# Patient Record
Sex: FEMALE | Race: WHITE | NOT HISPANIC OR LATINO | Employment: OTHER | URBAN - METROPOLITAN AREA
[De-identification: names, ages, dates, MRNs, and addresses within clinical notes are randomized per-mention and may not be internally consistent; named-entity substitution may affect disease eponyms.]

---

## 2020-09-11 ENCOUNTER — TELEPHONE (OUTPATIENT)
Dept: FAMILY MEDICINE CLINIC | Facility: CLINIC | Age: 80
End: 2020-09-11

## 2020-09-11 ENCOUNTER — OFFICE VISIT (OUTPATIENT)
Dept: FAMILY MEDICINE CLINIC | Facility: CLINIC | Age: 80
End: 2020-09-11

## 2020-09-11 VITALS
DIASTOLIC BLOOD PRESSURE: 62 MMHG | BODY MASS INDEX: 56.74 KG/M2 | OXYGEN SATURATION: 99 % | HEIGHT: 60 IN | SYSTOLIC BLOOD PRESSURE: 138 MMHG | HEART RATE: 76 BPM | TEMPERATURE: 96.6 F | WEIGHT: 289 LBS

## 2020-09-11 DIAGNOSIS — E66.01 MORBIDLY OBESE (HCC): ICD-10-CM

## 2020-09-11 DIAGNOSIS — M79.7 FIBROMYALGIA: Primary | ICD-10-CM

## 2020-09-11 DIAGNOSIS — I10 ESSENTIAL HYPERTENSION: ICD-10-CM

## 2020-09-11 PROCEDURE — 99203 OFFICE O/P NEW LOW 30 MIN: CPT | Performed by: PHYSICIAN ASSISTANT

## 2020-09-11 RX ORDER — BENAZEPRIL HYDROCHLORIDE 20 MG/1
20 TABLET ORAL DAILY
Qty: 90 TABLET | Refills: 3 | Status: SHIPPED | OUTPATIENT
Start: 2020-09-11 | End: 2021-10-20

## 2020-09-11 RX ORDER — AMLODIPINE BESYLATE 5 MG/1
5 TABLET ORAL DAILY
Qty: 90 TABLET | Refills: 3 | Status: SHIPPED | OUTPATIENT
Start: 2020-09-11 | End: 2021-09-21

## 2020-09-11 RX ORDER — TIZANIDINE 4 MG/1
4 TABLET ORAL 2 TIMES DAILY
Qty: 180 TABLET | Refills: 3 | Status: SHIPPED | OUTPATIENT
Start: 2020-09-11 | End: 2021-12-20

## 2020-09-11 RX ORDER — DULOXETIN HYDROCHLORIDE 60 MG/1
CAPSULE, DELAYED RELEASE ORAL
COMMUNITY
End: 2020-09-11 | Stop reason: SDUPTHER

## 2020-09-11 RX ORDER — DULOXETIN HYDROCHLORIDE 60 MG/1
60 CAPSULE, DELAYED RELEASE ORAL 2 TIMES DAILY
Qty: 180 CAPSULE | Refills: 3 | Status: SHIPPED | OUTPATIENT
Start: 2020-09-11 | End: 2020-09-11 | Stop reason: SDUPTHER

## 2020-09-11 RX ORDER — TIZANIDINE 4 MG/1
TABLET ORAL
COMMUNITY
End: 2020-09-11 | Stop reason: SDUPTHER

## 2020-09-11 RX ORDER — BENAZEPRIL HYDROCHLORIDE 20 MG/1
TABLET ORAL
COMMUNITY
End: 2020-09-11 | Stop reason: SDUPTHER

## 2020-09-11 RX ORDER — DULOXETIN HYDROCHLORIDE 60 MG/1
CAPSULE, DELAYED RELEASE ORAL
Qty: 180 CAPSULE | Refills: 3 | Status: SHIPPED | OUTPATIENT
Start: 2020-09-11 | End: 2021-11-30

## 2020-09-11 RX ORDER — BETAMETHASONE DIPROPIONATE 0.5 MG/G
CREAM TOPICAL
COMMUNITY

## 2020-09-11 RX ORDER — AMLODIPINE BESYLATE 5 MG/1
TABLET ORAL
COMMUNITY
End: 2020-09-11 | Stop reason: SDUPTHER

## 2020-09-11 RX ORDER — PHENTERMINE HYDROCHLORIDE 37.5 MG/1
TABLET ORAL
COMMUNITY
End: 2021-06-24 | Stop reason: SDUPTHER

## 2020-09-11 NOTE — PROGRESS NOTES
Subjective   Desirae Menchaca is a 80 y.o. female  Establish Care (new patient est care, previous pcp shanti kelley ); Obesity (req refills on phentermine for wt loss ); and Hypertension (req refills on amlodipine for BP)      History of Present Illness  Patient is a pleasant 80-year-old white female who presents today as a new patient to our practice to establish care.  She is transferring her care from [facility].  Her daughter, Leesa Lew comes to our practice as a patient.  She is presenting today for follow-up on hypertension, fibromyalgia and morbid obesity.  Unfortunate her obesity is uncontrolled.  She has done well on phentermine in the past and is requesting refill this medication.  She is status post lap band surgery.  However she is also a survivor of gastric cancer in 2016.  She states that her fibromyalgia actually improved after going through chemo but her LAP-BAND had to be removed and her obesity is continued to struggle.  Blood pressures been stable on combination amlodipine benazepril, his fibromyalgia currently stable on Cymbalta and Zanaflex.  The following portions of the patient's history were reviewed and updated as appropriate: allergies, current medications, past social history and problem list    Review of Systems   Constitutional: Positive for appetite change and unexpected weight change. Negative for activity change and fatigue.   Respiratory: Negative for cough, chest tightness and shortness of breath.    Cardiovascular: Negative for chest pain, palpitations and leg swelling.   Gastrointestinal: Negative for abdominal distention, abdominal pain, diarrhea and nausea.   Genitourinary: Negative.    Musculoskeletal: Positive for myalgias ( Fibromyalgia stable on medication). Negative for arthralgias, gait problem and joint swelling.   Skin: Negative.  Negative for color change and rash.   Neurological: Negative for dizziness, syncope, weakness, numbness and headaches.    Psychiatric/Behavioral: Negative for dysphoric mood. The patient is not nervous/anxious.        Objective     Vitals:    09/11/20 0938   BP: 138/62   Pulse: 76   Temp: 96.6 °F (35.9 °C)   SpO2: 99%   BMI 56.4    Physical Exam   Constitutional: She is oriented to person, place, and time. She appears well-developed and well-nourished. No distress.   Obesity noted     HENT:   Head: Normocephalic and atraumatic.   Neck: No thyromegaly present.   Cardiovascular: Normal rate and regular rhythm.   Pulmonary/Chest: Effort normal and breath sounds normal. No respiratory distress.   Abdominal: Soft. There is no tenderness.   Neurological: She is alert and oriented to person, place, and time. No cranial nerve deficit. Coordination normal.   Skin: Skin is warm and dry. No rash noted. She is not diaphoretic. No erythema. No pallor.   Psychiatric: She has a normal mood and affect. Her behavior is normal. Judgment and thought content normal.   Nursing note and vitals reviewed.      Assessment/Plan     Desirae was seen today for establish care, obesity and hypertension.    Diagnoses and all orders for this visit:    Fibromyalgia    Essential hypertension    Morbidly obese (CMS/HCC)    Other orders  -     amLODIPine (NORVASC) 5 MG tablet; Take 1 tablet by mouth Daily. amlodipine 5 mg tablet  -     benazepril (LOTENSIN) 20 MG tablet; Take 1 tablet by mouth Daily. benazepril 20 mg tablet  -     tiZANidine (ZANAFLEX) 4 MG tablet; Take 1 tablet by mouth 2 (two) times a day.  1 tab two times a day  -     Discontinue: DULoxetine (Cymbalta) 60 MG capsule; Take 1 capsule by mouth 2 (Two) Times a Day. Cymbalta 60 mg capsule,delayed release  Take 2 capsules every day by oral route.    Prescription given for phentermine 37.5 mg tablets 1 daily #30 with 5 refills. discussed abuse potential controlled substance as of this medication.  Courage patient follow low calorie low-carb high-protein diet with adequate water intake and exercise on a daily  basis.  Follow-up in 6 months for recheck.

## 2020-09-11 NOTE — TELEPHONE ENCOUNTER
Pharmacy is calling about DULoxetine (Cymbalta) 60 MG capsule.  Pharmacy states instructions that came over state 1 twice a day and 2 once a day,  Pharmacy just needs better clarification.  Please advise

## 2021-04-22 RX ORDER — PHENTERMINE HYDROCHLORIDE 37.5 MG/1
TABLET ORAL
Qty: 30 TABLET | Refills: 4 | OUTPATIENT
Start: 2021-04-22

## 2021-05-28 ENCOUNTER — TELEPHONE (OUTPATIENT)
Dept: FAMILY MEDICINE CLINIC | Facility: CLINIC | Age: 81
End: 2021-05-28

## 2021-05-28 NOTE — TELEPHONE ENCOUNTER
Caller: Desirae Menchaca    Relationship: Self    Best call back number: [phone]    What medication are you requesting: PHENTERMINE.  NEED NEW SCRIPT, REFILL    If a prescription is needed, what is your preferred pharmacy and phone number:    [facility] [address] [place] [address] [address] - [phone]  - [phone] FX

## 2021-05-28 NOTE — TELEPHONE ENCOUNTER
Notified the patient that with this being a controlled substance she would need to be seen in office to have this refilled. Scheduled her an appointment in [place]

## 2021-06-24 ENCOUNTER — OFFICE VISIT (OUTPATIENT)
Dept: FAMILY MEDICINE CLINIC | Facility: CLINIC | Age: 81
End: 2021-06-24

## 2021-06-24 VITALS
BODY MASS INDEX: 57.33 KG/M2 | DIASTOLIC BLOOD PRESSURE: 74 MMHG | HEART RATE: 88 BPM | OXYGEN SATURATION: 98 % | TEMPERATURE: 97.2 F | WEIGHT: 292 LBS | SYSTOLIC BLOOD PRESSURE: 118 MMHG | HEIGHT: 60 IN

## 2021-06-24 DIAGNOSIS — F51.04 CHRONIC INSOMNIA: ICD-10-CM

## 2021-06-24 DIAGNOSIS — E66.01 MORBIDLY OBESE (HCC): ICD-10-CM

## 2021-06-24 DIAGNOSIS — S40.811A ABRASION OF RIGHT UPPER EXTREMITY, INITIAL ENCOUNTER: ICD-10-CM

## 2021-06-24 DIAGNOSIS — F51.04 CHRONIC INSOMNIA: Primary | ICD-10-CM

## 2021-06-24 DIAGNOSIS — E66.01 MORBIDLY OBESE (HCC): Primary | ICD-10-CM

## 2021-06-24 DIAGNOSIS — I10 ESSENTIAL HYPERTENSION: ICD-10-CM

## 2021-06-24 PROCEDURE — 99214 OFFICE O/P EST MOD 30 MIN: CPT | Performed by: PHYSICIAN ASSISTANT

## 2021-06-24 RX ORDER — PHENTERMINE HYDROCHLORIDE 37.5 MG/1
37.5 TABLET ORAL
Qty: 30 TABLET | Refills: 5 | Status: SHIPPED | OUTPATIENT
Start: 2021-06-24 | End: 2022-08-09

## 2021-06-24 RX ORDER — ZOLPIDEM TARTRATE 10 MG/1
10 TABLET ORAL NIGHTLY PRN
Qty: 30 TABLET | Refills: 5 | Status: SHIPPED | OUTPATIENT
Start: 2021-06-24 | End: 2022-08-09

## 2021-06-24 RX ORDER — SULFAMETHOXAZOLE AND TRIMETHOPRIM 800; 160 MG/1; MG/1
1 TABLET ORAL 2 TIMES DAILY
Qty: 10 TABLET | Refills: 0 | Status: SHIPPED | OUTPATIENT
Start: 2021-06-24 | End: 2022-08-09

## 2021-06-24 RX ORDER — MAGNESIUM HYDROXIDE 1200 MG/15ML
1000 LIQUID ORAL 2 TIMES DAILY
Qty: 60 ML | Refills: 0 | Status: SHIPPED | OUTPATIENT
Start: 2021-06-24 | End: 2022-08-09

## 2021-06-24 NOTE — PROGRESS NOTES
Subjective   Desirae Menchaca is a 81 y.o. female  Weight Check (follow up on weight, req refills on phentermine ), Insomnia (increased insomnia x1 month, wants to discuss retarting ambien), and Wound Check (fell 2 weeks ago, wound on right forearm )      History of Present Illness  Patient is a pleasant 81-year-old white female who comes in today for evaluation of 4 separate uncontrolled medical problems.  She is follow-up on weight loss management association with morbid obesity she is been out of her phentermine for couple of months and she states that she is struggling with appetite suppression, BMI today is 57.03.  She feels significant improvement in her lipid control her appetite and eat a healthier lower calorie diet when on phentermine and denies any adverse effects when she is on it she states he feels better overall.  Second issue is chronic insomnia which is currently uncontrolled she has a mild Ambien in the past without this medication currently.  She is adding over-the-counter sleep aids without relief, has trouble falling asleep and staying asleep, has not had any adverse effects with Ambien in the past pacifically no nocturnal awakenings or sleepwalking.  She is able to fall asleep better and stay asleep better usually about 6 hours of solid sleep on Ambien.  Third issue is a wound on her right arm she states that she tripped over some shoes in her house and scraped her arm on the posterior chair a couple weeks ago scraping some of her skin off.  She has been applying over-the-counter antibiotic ointment and keeping clean and dry, no drainage but the wound has not healed.  Lastly patient is following up on hypertension which is currently stable on medication denies any adverse effect from medication.  The following portions of the patient's history were reviewed and updated as appropriate: allergies, current medications, past social history and problem list    Review of Systems   Constitutional:  Positive for activity change, appetite change and unexpected weight change. Negative for fatigue and fever.   Respiratory: Negative.    Cardiovascular: Negative.  Negative for chest pain.   Gastrointestinal: Negative for abdominal distention, abdominal pain, diarrhea and nausea.   Musculoskeletal: Negative for arthralgias.   Skin: Positive for color change and wound. Negative for pallor and rash.   Neurological: Negative.  Negative for tremors, weakness, light-headedness and headaches.   Psychiatric/Behavioral: Positive for sleep disturbance. Negative for agitation, behavioral problems, confusion, decreased concentration, dysphoric mood, hallucinations, self-injury and suicidal ideas. The patient is not nervous/anxious and is not hyperactive.      BMI 57.03  Objective     Vitals:    06/24/21 0902   BP: 118/74   Pulse: 88   Temp: 97.2 °F (36.2 °C)   SpO2: 98%       Physical Exam  Vitals and nursing note reviewed.   Constitutional:       General: She is not in acute distress.     Appearance: Normal appearance. She is well-developed. She is obese. She is not ill-appearing, toxic-appearing or diaphoretic.      Comments: Obesity noted     HENT:      Head: Normocephalic and atraumatic.   Eyes:      Conjunctiva/sclera: Conjunctivae normal.   Neck:      Thyroid: No thyromegaly.      Vascular: No JVD.   Cardiovascular:      Rate and Rhythm: Normal rate and regular rhythm.      Heart sounds: Normal heart sounds. No murmur heard.     Pulmonary:      Effort: Pulmonary effort is normal. No respiratory distress.      Breath sounds: Normal breath sounds.   Chest:      Chest wall: No tenderness.   Abdominal:      General: There is no distension.      Palpations: Abdomen is soft.      Tenderness: There is no abdominal tenderness.   Skin:     General: Skin is warm and dry.      Coloration: Skin is not pale.      Findings: Erythema present.      Comments: Examination of right forearm reveals an abrasion of the skin partially crusted  measuring 4 x 5 cm, no surrounding erythema, no drainage, nontender   Neurological:      Mental Status: She is alert and oriented to person, place, and time.      Coordination: Coordination normal.   Psychiatric:         Attention and Perception: She is attentive.         Mood and Affect: Mood normal.         Behavior: Behavior normal.         Thought Content: Thought content normal.         Judgment: Judgment normal.         Assessment/Plan     Diagnoses and all orders for this visit:    1. Morbidly obese (CMS/HCC) (Primary)    2. Chronic insomnia    3. Abrasion of right upper extremity, initial encounter    4. Essential hypertension    Other orders  -     sulfamethoxazole-trimethoprim (Bactrim DS) 800-160 MG per tablet; Take 1 tablet by mouth 2 (Two) Times a Day.  Dispense: 10 tablet; Refill: 0  -     mupirocin (BACTROBAN) 2 % ointment; Apply  topically to the appropriate area as directed 2 (Two) Times a Day.  Dispense: 15 g; Refill: 0  -     sodium chloride (NS) 0.9 % irrigation; Irrigate with 1,000 mL to the affected area as directed by provider 2 (two) times a day.  Dispense: 60 mL; Refill: 0    Prescriptions will be sent for phentermine 37.5 mg tablets 1 daily to assist with weight loss #30 with 5 refills and Ambien 10 mg 1 nightly for insomnia #30 with 5 refills.  Discussed abuse potential and controlled substance status of these medications, discussed need to follow a healthy low calorie high protein diet with adequate water intake.  Discussed need to make sure she is going to bed and stay in bed after taking Ambien at bedtime.  Follow-up in 6 months for recheck of insomnia, hypertension and weight loss management with obesity, follow-up in 2 weeks if wound on arm has not resolved or sooner if any worsening occurs.  Discussed routine wound care/bandaging for home as well

## 2021-09-21 RX ORDER — AMLODIPINE BESYLATE 5 MG/1
TABLET ORAL
Qty: 90 TABLET | Refills: 0 | Status: SHIPPED | OUTPATIENT
Start: 2021-09-21 | End: 2021-12-20

## 2021-10-20 RX ORDER — BENAZEPRIL HYDROCHLORIDE 20 MG/1
TABLET ORAL
Qty: 90 TABLET | Refills: 0 | Status: SHIPPED | OUTPATIENT
Start: 2021-10-20 | End: 2022-01-19 | Stop reason: SDUPTHER

## 2021-11-30 RX ORDER — DULOXETIN HYDROCHLORIDE 60 MG/1
CAPSULE, DELAYED RELEASE ORAL
Qty: 180 CAPSULE | Refills: 3 | Status: SHIPPED | OUTPATIENT
Start: 2021-11-30

## 2021-12-20 DIAGNOSIS — F51.04 CHRONIC INSOMNIA: ICD-10-CM

## 2021-12-20 RX ORDER — ZOLPIDEM TARTRATE 10 MG/1
TABLET ORAL
Qty: 30 TABLET | OUTPATIENT
Start: 2021-12-20

## 2021-12-20 RX ORDER — TIZANIDINE 4 MG/1
TABLET ORAL
Qty: 180 TABLET | Refills: 0 | Status: SHIPPED | OUTPATIENT
Start: 2021-12-20 | End: 2022-06-13

## 2021-12-20 RX ORDER — AMLODIPINE BESYLATE 5 MG/1
TABLET ORAL
Qty: 90 TABLET | Refills: 0 | Status: SHIPPED | OUTPATIENT
Start: 2021-12-20 | End: 2022-08-09

## 2022-01-19 RX ORDER — BENAZEPRIL HYDROCHLORIDE 20 MG/1
20 TABLET ORAL DAILY
Qty: 90 TABLET | Refills: 0 | Status: SHIPPED | OUTPATIENT
Start: 2022-01-19 | End: 2022-08-09

## 2022-02-24 ENCOUNTER — TELEPHONE (OUTPATIENT)
Dept: FAMILY MEDICINE CLINIC | Facility: CLINIC | Age: 82
End: 2022-02-24

## 2022-06-13 RX ORDER — TIZANIDINE 4 MG/1
TABLET ORAL
Qty: 180 TABLET | Refills: 3 | Status: SHIPPED | OUTPATIENT
Start: 2022-06-13

## 2022-08-05 ENCOUNTER — TELEPHONE (OUTPATIENT)
Dept: BARIATRICS/WEIGHT MGMT | Facility: CLINIC | Age: 82
End: 2022-08-05

## 2022-08-05 NOTE — TELEPHONE ENCOUNTER
[facility] called and stated that pt has inflammation around her lapband and needs appt with Dr pacheco BAEZ. Please advise. Thanks!     Narendra routed this back to me, and would like to know how to proceed.

## 2022-08-09 ENCOUNTER — OFFICE VISIT (OUTPATIENT)
Dept: BARIATRICS/WEIGHT MGMT | Facility: CLINIC | Age: 82
End: 2022-08-09

## 2022-08-09 VITALS
WEIGHT: 287 LBS | BODY MASS INDEX: 56.35 KG/M2 | DIASTOLIC BLOOD PRESSURE: 82 MMHG | OXYGEN SATURATION: 99 % | HEART RATE: 79 BPM | HEIGHT: 60 IN | TEMPERATURE: 97.1 F | SYSTOLIC BLOOD PRESSURE: 128 MMHG

## 2022-08-09 DIAGNOSIS — K95.09 COMPLICATION OF GASTRIC BANDING: Primary | ICD-10-CM

## 2022-08-09 PROCEDURE — 99214 OFFICE O/P EST MOD 30 MIN: CPT | Performed by: PHYSICIAN ASSISTANT

## 2022-08-09 RX ORDER — FUROSEMIDE 20 MG/1
20 TABLET ORAL DAILY PRN
COMMUNITY

## 2022-08-09 RX ORDER — BUSPIRONE HYDROCHLORIDE 10 MG/1
TABLET ORAL EVERY 12 HOURS SCHEDULED
COMMUNITY
End: 2022-10-18

## 2022-08-09 RX ORDER — CLOBETASOL PROPIONATE 0.46 MG/ML
SOLUTION TOPICAL
COMMUNITY

## 2022-08-09 RX ORDER — TRIAMTERENE AND HYDROCHLOROTHIAZIDE 37.5; 25 MG/1; MG/1
TABLET ORAL DAILY
COMMUNITY

## 2022-08-09 RX ORDER — BENAZEPRIL HYDROCHLORIDE 40 MG/1
TABLET, FILM COATED ORAL
COMMUNITY

## 2022-08-09 RX ORDER — ZOLPIDEM TARTRATE 10 MG/1
10 TABLET ORAL NIGHTLY PRN
COMMUNITY

## 2022-08-09 RX ORDER — OMEPRAZOLE 40 MG/1
CAPSULE, DELAYED RELEASE ORAL
COMMUNITY
End: 2022-10-18

## 2022-08-30 ENCOUNTER — TELEPHONE (OUTPATIENT)
Dept: BARIATRICS/WEIGHT MGMT | Facility: CLINIC | Age: 82
End: 2022-08-30

## 2022-08-30 NOTE — TELEPHONE ENCOUNTER
Called pt and told her we are still waiting on CD images from , I told pt I would request them again today, but she might want to call and get them as well to possibly get them sooner. Pt stated that she would call today and get them.

## 2022-09-27 PROBLEM — C54.1 ENDOMETRIAL CANCER (HCC): Status: ACTIVE | Noted: 2022-09-27

## 2022-09-27 PROBLEM — I10 HYPERTENSION: Status: ACTIVE | Noted: 2022-09-27

## 2022-09-27 PROBLEM — K21.9 GERD (GASTROESOPHAGEAL REFLUX DISEASE): Status: ACTIVE | Noted: 2022-09-27

## 2022-09-27 PROBLEM — Z74.09 IMPAIRED MOBILITY: Status: ACTIVE | Noted: 2022-09-27

## 2022-09-28 ENCOUNTER — TELEPHONE (OUTPATIENT)
Dept: BARIATRICS/WEIGHT MGMT | Facility: CLINIC | Age: 82
End: 2022-09-28

## 2022-09-28 PROBLEM — M79.7 FIBROMYALGIA: Status: ACTIVE | Noted: 2022-09-28

## 2022-09-28 PROBLEM — F41.9 ANXIETY: Status: ACTIVE | Noted: 2022-09-28

## 2022-09-28 PROBLEM — E04.1 THYROID NODULE: Status: ACTIVE | Noted: 2022-09-28

## 2022-09-28 NOTE — TELEPHONE ENCOUNTER
"Called pt. She stated that she has tried to get the CD images as well but they apparently keep sending her to some \"[facility] just like we were given. I have made multiple attempts to call [facility] with no success. Pt stated that she has an appt on campus on 10/03 and stated that she would stop by the medical records office and  disc in person. I gave pt location and directions of how to get to the medical records. Pt understood with no questions or concerns.   "

## 2022-10-06 ENCOUNTER — TELEPHONE (OUTPATIENT)
Dept: BARIATRICS/WEIGHT MGMT | Facility: CLINIC | Age: 82
End: 2022-10-06

## 2022-10-06 NOTE — TELEPHONE ENCOUNTER
Pt called, would like to know if/when Dr. Carlson is going to review her CT images and get his recommendations. Please advise, thanks!

## 2022-10-11 RX ORDER — SODIUM CHLORIDE 0.9 % (FLUSH) 0.9 %
3 SYRINGE (ML) INJECTION EVERY 12 HOURS SCHEDULED
Status: CANCELLED | OUTPATIENT
Start: 2022-10-11

## 2022-10-11 RX ORDER — SODIUM CHLORIDE 9 MG/ML
150 INJECTION, SOLUTION INTRAVENOUS CONTINUOUS
Status: CANCELLED | OUTPATIENT
Start: 2022-10-11

## 2022-10-11 RX ORDER — SODIUM CHLORIDE 0.9 % (FLUSH) 0.9 %
3-10 SYRINGE (ML) INJECTION AS NEEDED
Status: CANCELLED | OUTPATIENT
Start: 2022-10-11

## 2022-10-12 NOTE — TELEPHONE ENCOUNTER
Called pt, advised about Dr. Carlson' concerns, EGD was orders and that she will be called to schedule. Pt understood with no further questions or concerns.

## 2022-10-17 PROBLEM — K95.09 COMPLICATION OF GASTRIC BANDING: Status: ACTIVE | Noted: 2022-10-17

## 2022-10-18 ENCOUNTER — OFFICE VISIT (OUTPATIENT)
Dept: BARIATRICS/WEIGHT MGMT | Facility: CLINIC | Age: 82
End: 2022-10-18

## 2022-10-18 VITALS
OXYGEN SATURATION: 97 % | WEIGHT: 276.5 LBS | DIASTOLIC BLOOD PRESSURE: 56 MMHG | HEIGHT: 60 IN | RESPIRATION RATE: 14 BRPM | TEMPERATURE: 96.8 F | SYSTOLIC BLOOD PRESSURE: 154 MMHG | HEART RATE: 119 BPM | BODY MASS INDEX: 54.28 KG/M2

## 2022-10-18 DIAGNOSIS — K95.09 COMPLICATION OF GASTRIC BANDING: Primary | ICD-10-CM

## 2022-10-18 PROCEDURE — 99214 OFFICE O/P EST MOD 30 MIN: CPT | Performed by: PHYSICIAN ASSISTANT

## 2022-10-18 RX ORDER — OMEPRAZOLE 40 MG/1
1 CAPSULE, DELAYED RELEASE ORAL DAILY
COMMUNITY

## 2022-10-18 RX ORDER — BUSPIRONE HYDROCHLORIDE 10 MG/1
10 TABLET ORAL DAILY PRN
COMMUNITY

## 2022-10-18 RX ORDER — TRAZODONE HYDROCHLORIDE 50 MG/1
TABLET ORAL
COMMUNITY
Start: 2022-09-26

## 2022-10-18 NOTE — PROGRESS NOTES
"[facility]  [address]  [address]  [facility] [id number] [phone]        Patient Name: Desirae Menchaca  YOB: 1940      Date of Visit: 10/18/2022      CC:  KATY eval - requesting band removal      HPI:  82 y.o. female s/p LAGB REG 11/2006 [facility].      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  s/p LIZA BSO omentectomy and AGB port removal 11/10/16 @[facility] - no residual CA.    LOV w/ GYN/ONC 7/21/22 - \"Patient presents for f/u of gyn malignancy (see details below). Patient recently went to outside clinic for CT and was unable to have the scan due to inability to obtain IV access. She called me before today's visit to notify me and stated that she would decline the scan anyway if her Ca125 was normal. She also states that due to impaired mobility, she declines pelvic exams and is followed by CT surveillance yearly. From the attempted sticks at the CT facility, she is very bruised on her arms. Furthermore, she reports difficulty swallowing, stating that she previously had a lap band procedure and feels this may be related to her band. Patient states she is unable to tolerate more than small amounts of food and feels she has a blockage. Also having heart burn. No VB, new pelvic pain, or changes in bowel or bladder. \"    CT ab/pel 7/29/22 @[facility] - \"Gastric lap band in similar position to comparison. Development of ill-defined soft tissue density adjacent to the gastric band along the lesser curvature of the stomach measuring approximately 3.6 x 2.6 cm (series 3 image 66). Connection tubing terminates in the anterior abdomen without subcutaneous access port. Development of ill-defined low-attenuation along the lesser curvature of the stomach, likely related to complication of adjacent lap band and less likely metastatic disease. \"    CT images reviewed by Dr. Carlson - concerning for band erosion, also noted " gallstones.      Returns today for reevaluation.  In a wheelchair, accompanied by her .  Reports progressive/worsening dysphagia for several months, and now w/ vomiting/regurgitation on a daily basis.  Has burning throat pain despite taking daily PPI.  Denies abd.pain.  No fevers/chills.      Desires band removal.  No recent endoscopic evals.  No recent labs.     Noted tachycardia on triage, but says was nervous about our evaluation today, and also says had difficulty transferring from wheelchair to weigh on the scale.  HR improved to 85 on recheck, after evaluation completed.  Denies hx cardiac issue.        Past Medical History:   Diagnosis Date   • Anxiety    • Arthritis    • Asthma    • Cataract    • Endometrial cancer (HCC) 2016    s/p TAHBSO + chemo, follows @[facility]   • Fibromyalgia    • GERD (gastroesophageal reflux disease)     on PPI   • Hyperlipidemia    • Hypertension    • Impaired mobility    • Morbid obesity (HCC)    • Thyroid nodule     U/S, eval pending     Past Surgical History:   Procedure Laterality Date   • ANKLE SURGERY     • BREAST BIOPSY     • EYE SURGERY     • FRACTURE SURGERY     • LAPAROSCOPIC GASTRIC BANDING  2006    w/ Dr. Carlson   • TONSILLECTOMY     • TOTAL ABDOMINAL HYSTERECTOMY  2016         Current Outpatient Medications:   •  Ammonium Lactate 5 % lotion, Every 12 (Twelve) Hours. Every 12 (Twelve) Hours., Disp: , Rfl:   •  benazepril (LOTENSIN) 40 MG tablet, benazepril 40 mg tablet  TAKE ONE TABLET BY MOUTH DAILY, Disp: , Rfl:   •  betamethasone, augmented, (DIPROLENE) 0.05 % cream, betamethasone, augmented 0.05 % topical cream  Apply to the palms and fingers twce a day for 2 weeks, then 2 times a week, if needed., Disp: , Rfl:   •  busPIRone (BUSPAR) 10 MG tablet, Every 12 (Twelve) Hours., Disp: , Rfl:   •  clobetasol (TEMOVATE) 0.05 % external solution, clobetasol 0.05 % scalp solution  Apply a thin layer to the affected areas on the scalp, nightly 2- 3 times a week, if needed.,  "Disp: , Rfl:   •  Diclofenac Sodium (VOLTAREN) 1 % gel gel, Voltaren 1 % topical gel  0.5 gm 2x/day, Disp: , Rfl:   •  DULoxetine (CYMBALTA) 60 MG capsule, TAKE TWO CAPSULES BY MOUTH DAILY, Disp: 180 capsule, Rfl: 3  •  furosemide (LASIX) 20 MG tablet, Take 20 mg by mouth Daily As Needed., Disp: , Rfl:   •  mupirocin (BACTROBAN) 2 % ointment, Apply  topically to the appropriate area as directed 2 (Two) Times a Day., Disp: 15 g, Rfl: 0  •  omeprazole (priLOSEC) 40 MG capsule, 1 capsule Daily., Disp: , Rfl:   •  tiZANidine (ZANAFLEX) 4 MG tablet, TAKE ONE TABLET BY MOUTH TWICE A DAY, Disp: 180 tablet, Rfl: 3  •  traZODone (DESYREL) 50 MG tablet, , Disp: , Rfl:   •  triamterene-hydrochlorothiazide (MAXZIDE-25) 37.5-25 MG per tablet, Daily., Disp: , Rfl:   •  zolpidem (AMBIEN) 10 MG tablet, Take 10 mg by mouth At Night As Needed for Sleep., Disp: , Rfl:     Allergies   Allergen Reactions   • Dexamethasone Swelling   • Ciprofloxacin Unknown - Low Severity   • Risperidone Unknown - Low Severity     Social History     Tobacco Use   • Smoking status: Never   Substance Use Topics   • Alcohol use: Yes     Alcohol/week: 10.0 standard drinks     Types: 10 Glasses of wine per week   • Drug use: Never       Review of Systems  As above.      /56 (BP Location: Right arm, Patient Position: Sitting, Cuff Size: Adult)   Pulse 119   Temp 96.8 °F (36 °C) (Infrared)   Resp 14   Ht 152.4 cm (60\")   Wt 125 kg (276 lb 8 oz)   SpO2 97%   BMI 54.00 kg/m²     Physical Exam  Constitutional:       Appearance: She is well-developed.      Comments: wearing a mask   Eyes:      General: No scleral icterus.  Cardiovascular:      Rate and Rhythm: Normal rate.      Comments: HR recheck 85  Pulmonary:      Effort: Pulmonary effort is normal.   Abdominal:      Palpations: Abdomen is soft.      Tenderness: There is no abdominal tenderness.      Hernia: No hernia is present.   Musculoskeletal:      Comments: in a wheelchair   Skin:     " General: Skin is warm and dry.      Findings: No rash.   Neurological:      Mental Status: She is alert.   Psychiatric:         Behavior: Behavior is cooperative.         Judgment: Judgment normal.           Assessment:   82 y.o. female s/p LAGB REG 11/2006 [facility], s/p port removal 2016 w/ GYN surgery @, pursuing band removal - CT findings concerning for band erosion.        Plan:   Will schedule EGD @[facility] w/ Dr. Carlson.  Risks/benefits discussed.  Additional input to follow.          FISH Bermudez

## 2022-10-24 ENCOUNTER — PRE-ADMISSION TESTING (OUTPATIENT)
Dept: PREADMISSION TESTING | Facility: HOSPITAL | Age: 82
End: 2022-10-24

## 2022-10-24 LAB
DEPRECATED RDW RBC AUTO: 44 FL (ref 37–54)
ERYTHROCYTE [DISTWIDTH] IN BLOOD BY AUTOMATED COUNT: 13.5 % (ref 12.3–15.4)
HCT VFR BLD AUTO: 40.7 % (ref 34–46.6)
HGB BLD-MCNC: 13.2 G/DL (ref 12–15.9)
MCH RBC QN AUTO: 28.6 PG (ref 26.6–33)
MCHC RBC AUTO-ENTMCNC: 32.4 G/DL (ref 31.5–35.7)
MCV RBC AUTO: 88.3 FL (ref 79–97)
PLATELET # BLD AUTO: 323 10*3/MM3 (ref 140–450)
PMV BLD AUTO: 9.9 FL (ref 6–12)
POTASSIUM SERPL-SCNC: 4.6 MMOL/L (ref 3.5–5.2)
RBC # BLD AUTO: 4.61 10*6/MM3 (ref 3.77–5.28)
WBC NRBC COR # BLD: 9.41 10*3/MM3 (ref 3.4–10.8)

## 2022-10-24 PROCEDURE — 85027 COMPLETE CBC AUTOMATED: CPT

## 2022-10-24 PROCEDURE — 36415 COLL VENOUS BLD VENIPUNCTURE: CPT

## 2022-10-24 PROCEDURE — 84132 ASSAY OF SERUM POTASSIUM: CPT

## 2022-10-24 NOTE — PAT
Patient viewed general PAT education video as instructed in their preoperative information received from their surgeon.  Patient stated the general PAT education video was viewed in its entirety and survey completed.  Copies of [facility] general education handouts (Incentive Spirometry, Meds to Beds Program, Patient Belongings, Pre-op skin preparation instructions, Blood Glucose testing, Visitor policy, Surgery FAQ, Code H) distributed to patient if not printed. Education related to the PAT pass and skin preparation for surgery (if applicable) completed in PAT as a reinforcement to PAT education video. Patient instructed to return PAT pass provided today as well as completed skin preparation sheet (if applicable) on the day of procedure.     Additionally if patient had not viewed video yet but intended to view it at home or in our waiting area, then referred them to the handout with QR code/link provided during PAT visit.  Instructed patient to complete survey after viewing the video in its entirety.  Encouraged patient/family to read [facility] general education handouts thoroughly and notify PAT staff with any questions or concerns. Patient verbalized understanding of all information and priority content.    Pt is seeing her cardiologist tomorrow for ekg and clearance (already set up for upcoming bariatric surgery). Pat to follow up tomorrow to make sure get copy of ekg and clearance on chart.

## 2022-10-25 NOTE — PAT
Called Amena's office requesting the EKG and cardiac clearance patient obtained today. The  was able to verbally state that the patient had an Echo and cardiac clearance but an EKG was not done.  sent a message to Dr. Beckwith to fax those records. At end of business day, no documents received by PAT for procedure tomorrow.    Please do an EKG on DOS. Chart logged to endoscopy.

## 2022-10-26 ENCOUNTER — ANESTHESIA (OUTPATIENT)
Dept: GASTROENTEROLOGY | Facility: HOSPITAL | Age: 82
End: 2022-10-26

## 2022-10-26 ENCOUNTER — HOSPITAL ENCOUNTER (OUTPATIENT)
Facility: HOSPITAL | Age: 82
Setting detail: HOSPITAL OUTPATIENT SURGERY
Discharge: HOME | End: 2022-10-26
Attending: SURGERY | Admitting: SURGERY

## 2022-10-26 ENCOUNTER — ANESTHESIA EVENT (OUTPATIENT)
Dept: GASTROENTEROLOGY | Facility: HOSPITAL | Age: 82
End: 2022-10-26

## 2022-10-26 VITALS
SYSTOLIC BLOOD PRESSURE: 157 MMHG | DIASTOLIC BLOOD PRESSURE: 76 MMHG | RESPIRATION RATE: 16 BRPM | OXYGEN SATURATION: 93 % | TEMPERATURE: 98.3 F | HEART RATE: 89 BPM

## 2022-10-26 DIAGNOSIS — K95.09 COMPLICATION OF GASTRIC BANDING: ICD-10-CM

## 2022-10-26 DIAGNOSIS — K80.20 CALCULUS OF GALLBLADDER WITHOUT CHOLECYSTITIS WITHOUT OBSTRUCTION: Primary | ICD-10-CM

## 2022-10-26 LAB
QT INTERVAL: 418 MS
QTC INTERVAL: 479 MS

## 2022-10-26 PROCEDURE — 88305 TISSUE EXAM BY PATHOLOGIST: CPT | Performed by: SURGERY

## 2022-10-26 PROCEDURE — 93005 ELECTROCARDIOGRAM TRACING: CPT | Performed by: ANESTHESIOLOGY

## 2022-10-26 PROCEDURE — 25010000002 PROPOFOL 10 MG/ML EMULSION

## 2022-10-26 PROCEDURE — 43239 EGD BIOPSY SINGLE/MULTIPLE: CPT | Performed by: SURGERY

## 2022-10-26 PROCEDURE — 93010 ELECTROCARDIOGRAM REPORT: CPT | Performed by: INTERNAL MEDICINE

## 2022-10-26 RX ORDER — PROPOFOL 10 MG/ML
VIAL (ML) INTRAVENOUS AS NEEDED
Status: DISCONTINUED | OUTPATIENT
Start: 2022-10-26 | End: 2022-10-26 | Stop reason: SURG

## 2022-10-26 RX ORDER — ESMOLOL HYDROCHLORIDE 10 MG/ML
INJECTION INTRAVENOUS AS NEEDED
Status: DISCONTINUED | OUTPATIENT
Start: 2022-10-26 | End: 2022-10-26 | Stop reason: SURG

## 2022-10-26 RX ORDER — SODIUM CHLORIDE 0.9 % (FLUSH) 0.9 %
3-10 SYRINGE (ML) INJECTION AS NEEDED
Status: DISCONTINUED | OUTPATIENT
Start: 2022-10-26 | End: 2022-10-26 | Stop reason: HOSPADM

## 2022-10-26 RX ORDER — SODIUM CHLORIDE 0.9 % (FLUSH) 0.9 %
3 SYRINGE (ML) INJECTION EVERY 12 HOURS SCHEDULED
Status: DISCONTINUED | OUTPATIENT
Start: 2022-10-26 | End: 2022-10-26

## 2022-10-26 RX ORDER — IPRATROPIUM BROMIDE AND ALBUTEROL SULFATE 2.5; .5 MG/3ML; MG/3ML
3 SOLUTION RESPIRATORY (INHALATION) ONCE AS NEEDED
Status: DISCONTINUED | OUTPATIENT
Start: 2022-10-26 | End: 2022-10-26 | Stop reason: HOSPADM

## 2022-10-26 RX ORDER — SODIUM CHLORIDE, SODIUM LACTATE, POTASSIUM CHLORIDE, CALCIUM CHLORIDE 600; 310; 30; 20 MG/100ML; MG/100ML; MG/100ML; MG/100ML
9 INJECTION, SOLUTION INTRAVENOUS CONTINUOUS
Status: DISCONTINUED | OUTPATIENT
Start: 2022-10-26 | End: 2022-10-26 | Stop reason: HOSPADM

## 2022-10-26 RX ORDER — SODIUM CHLORIDE 0.9 % (FLUSH) 0.9 %
10 SYRINGE (ML) INJECTION EVERY 12 HOURS SCHEDULED
Status: DISCONTINUED | OUTPATIENT
Start: 2022-10-26 | End: 2022-10-26 | Stop reason: HOSPADM

## 2022-10-26 RX ORDER — FAMOTIDINE 10 MG/ML
20 INJECTION, SOLUTION INTRAVENOUS ONCE
Status: DISCONTINUED | OUTPATIENT
Start: 2022-10-26 | End: 2022-10-26 | Stop reason: HOSPADM

## 2022-10-26 RX ORDER — MIDAZOLAM HYDROCHLORIDE 1 MG/ML
0.5 INJECTION INTRAMUSCULAR; INTRAVENOUS
Status: DISCONTINUED | OUTPATIENT
Start: 2022-10-26 | End: 2022-10-26

## 2022-10-26 RX ORDER — FAMOTIDINE 20 MG/1
20 TABLET, FILM COATED ORAL ONCE
Status: DISCONTINUED | OUTPATIENT
Start: 2022-10-26 | End: 2022-10-26

## 2022-10-26 RX ORDER — LIDOCAINE HYDROCHLORIDE 10 MG/ML
0.5 INJECTION, SOLUTION EPIDURAL; INFILTRATION; INTRACAUDAL; PERINEURAL ONCE AS NEEDED
Status: DISCONTINUED | OUTPATIENT
Start: 2022-10-26 | End: 2022-10-26

## 2022-10-26 RX ORDER — LIDOCAINE HYDROCHLORIDE 10 MG/ML
INJECTION, SOLUTION EPIDURAL; INFILTRATION; INTRACAUDAL; PERINEURAL AS NEEDED
Status: DISCONTINUED | OUTPATIENT
Start: 2022-10-26 | End: 2022-10-26 | Stop reason: SURG

## 2022-10-26 RX ORDER — SODIUM CHLORIDE 9 MG/ML
150 INJECTION, SOLUTION INTRAVENOUS CONTINUOUS
Status: DISCONTINUED | OUTPATIENT
Start: 2022-10-26 | End: 2022-10-26 | Stop reason: HOSPADM

## 2022-10-26 RX ORDER — ONDANSETRON 2 MG/ML
4 INJECTION INTRAMUSCULAR; INTRAVENOUS ONCE AS NEEDED
Status: DISCONTINUED | OUTPATIENT
Start: 2022-10-26 | End: 2022-10-26 | Stop reason: HOSPADM

## 2022-10-26 RX ORDER — SODIUM CHLORIDE 0.9 % (FLUSH) 0.9 %
10 SYRINGE (ML) INJECTION AS NEEDED
Status: DISCONTINUED | OUTPATIENT
Start: 2022-10-26 | End: 2022-10-26 | Stop reason: HOSPADM

## 2022-10-26 RX ADMIN — PROPOFOL 50 MCG/KG/MIN: 10 INJECTION, EMULSION INTRAVENOUS at 09:33

## 2022-10-26 RX ADMIN — PROPOFOL 100 MG: 10 INJECTION, EMULSION INTRAVENOUS at 09:33

## 2022-10-26 RX ADMIN — LIDOCAINE HYDROCHLORIDE 100 MG: 10 INJECTION, SOLUTION EPIDURAL; INFILTRATION; INTRACAUDAL; PERINEURAL at 09:33

## 2022-10-26 RX ADMIN — TOPICAL ANESTHETIC 2 SPRAY: 200 SPRAY DENTAL; PERIODONTAL at 09:20

## 2022-10-26 RX ADMIN — ESMOLOL HYDROCHLORIDE 10 MG: 10 INJECTION, SOLUTION INTRAVENOUS at 09:38

## 2022-10-26 RX ADMIN — Medication 10 ML: at 09:19

## 2022-10-26 RX ADMIN — SODIUM CHLORIDE, POTASSIUM CHLORIDE, SODIUM LACTATE AND CALCIUM CHLORIDE 9 ML/HR: 600; 310; 30; 20 INJECTION, SOLUTION INTRAVENOUS at 09:19

## 2022-10-26 NOTE — ANESTHESIA POSTPROCEDURE EVALUATION
Patient: Desirae Menchaca    Procedure Summary     Date: 10/26/22 Room / Location:  [facility] /  [facility]    Anesthesia Start: 0930 Anesthesia Stop: 0955    Procedure: ESOPHAGOGASTRODUODENOSCOPY WITH BIOPSY Diagnosis:       Complication of gastric banding      (Complication of gastric banding [K95.09])    Surgeons: Sulaiman Carlson MD Provider: Alvino Hamlin MD    Anesthesia Type: general, MAC ASA Status: 3          Anesthesia Type: general, MAC    Vitals  Vitals Value Taken Time   BP     Temp     Pulse 88 10/26/22 0954   Resp     SpO2 100 % 10/26/22 0954   Vitals shown include unvalidated device data.        Post Anesthesia Care and Evaluation    Patient location during evaluation: PACU  Patient participation: complete - patient participated  Level of consciousness: awake and alert  Pain management: adequate    Airway patency: patent  Anesthetic complications: No anesthetic complications  PONV Status: none  Cardiovascular status: hemodynamically stable and acceptable  Respiratory status: nonlabored ventilation, acceptable and face mask  Hydration status: acceptable    Comments: Sats 100%  /80  HR 91

## 2022-10-26 NOTE — ANESTHESIA PREPROCEDURE EVALUATION
Anesthesia Evaluation     Patient summary reviewed and Nursing notes reviewed   NPO Solid Status: > 8 hours  NPO Liquid Status: > 2 hours           Airway   Mallampati: II  TM distance: >3 FB  Neck ROM: full  No difficulty expected  Dental      Pulmonary    (-) COPD, asthma (no MDI), shortness of breath, recent URI, sleep apnea, not a smoker, no home oxygen  Cardiovascular   Exercise tolerance: poor (<4 METS)    ECG reviewed    (+) hypertension, hyperlipidemia,   (-) valvular problems/murmurs, CAD, dysrhythmias, angina, cardiac stents    ROS comment: ECG SR 1 AVB RBBB LpostB bifascicular block  ECHO  Cleared as low risk [place]ro    Neuro/Psych  (+) headaches, psychiatric history,    (-) seizures, CVA  GI/Hepatic/Renal/Endo    (+) obesity, morbid obesity, GERD,  thyroid problem hypothyroidism  (-) no renal disease (creat nl), diabetes    ROS Comment: S/p G banding complications -vomiting     Musculoskeletal     (+) back pain, myalgias,   Abdominal    Substance History      OB/GYN          Other   arthritis (sdevere back and knee pain limits activity),    history of cancer (endometrial ca)                  Anesthesia Plan    ASA 3     general and MAC     (TOP POM PFL)  intravenous induction       Plan discussed with CRNA.        CODE STATUS:

## 2022-10-27 LAB
CYTO UR: NORMAL
LAB AP CASE REPORT: NORMAL
LAB AP CLINICAL INFORMATION: NORMAL
PATH REPORT.FINAL DX SPEC: NORMAL
PATH REPORT.GROSS SPEC: NORMAL

## 2022-10-31 ENCOUNTER — TELEPHONE (OUTPATIENT)
Dept: BARIATRICS/WEIGHT MGMT | Facility: CLINIC | Age: 82
End: 2022-10-31

## 2022-10-31 NOTE — TELEPHONE ENCOUNTER
Pt has EGD on 10/26/22, would like to know the results from this with pathology results. Please advise, thanks!

## 2022-11-01 ENCOUNTER — TELEPHONE (OUTPATIENT)
Dept: BARIATRICS/WEIGHT MGMT | Facility: CLINIC | Age: 82
End: 2022-11-01

## 2022-11-01 NOTE — TELEPHONE ENCOUNTER
Called scheduling to get GBUS scheduled. It is scheduled for 11/03/22 @ 9:30am.     I called and gave pt details and instructions. Pt understood with no further questions or concerns and was thankful for the call and quick response.

## 2022-11-01 NOTE — TELEPHONE ENCOUNTER
GBUS order placed 10/26.       ----- Message from Sulaiman Carlson MD sent at 10/26/2022  9:43 AM EDT -----  Fortunately no erosion seen.  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  Please order an ultrasound of her gallbladder, CT scan shows gallstones.  Once I have seen the ultrasound results please schedule her for Lap AGBR/chanda poss IOC in the main OR with overnight stay.  Thanks!

## 2022-11-03 ENCOUNTER — HOSPITAL ENCOUNTER (OUTPATIENT)
Dept: ULTRASOUND IMAGING | Facility: HOSPITAL | Age: 82
Discharge: HOME | End: 2022-11-03
Admitting: PHYSICIAN ASSISTANT

## 2022-11-03 ENCOUNTER — TELEPHONE (OUTPATIENT)
Dept: BARIATRICS/WEIGHT MGMT | Facility: CLINIC | Age: 82
End: 2022-11-03

## 2022-11-03 DIAGNOSIS — K95.09 GASTRIC BAND SLIPPAGE: Primary | ICD-10-CM

## 2022-11-03 DIAGNOSIS — K80.20 CALCULUS OF GALLBLADDER WITHOUT CHOLECYSTITIS WITHOUT OBSTRUCTION: ICD-10-CM

## 2022-11-03 PROCEDURE — 76705 ECHO EXAM OF ABDOMEN: CPT

## 2022-11-03 NOTE — TELEPHONE ENCOUNTER
----- Message from Sulaiman Carlson MD sent at 11/3/2022  3:55 PM EDT -----  Yes okay to schedule, thanks!  ----- Message -----  From: Dinora Yang PA  Sent: 11/3/2022   3:15 PM EDT  To: Sulaiman Carlson MD    Here are the ultrasound results. Ok to schedule as outlined below?     IMPRESSION:  Moderate diffuse hepatic steatosis.     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        ----- Message -----  From: Sulaiman Carlson MD  Sent: 10/26/2022   9:44 AM EDT  To: FISH Mckay    Fortunately no erosion seen.  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  Please order an ultrasound of her gallbladder, CT scan shows gallstones.  Once I have seen the ultrasound results please schedule her for Lap AGBR/chanda poss IOC in the main OR with overnight stay.  Thanks!

## 2022-11-22 ENCOUNTER — TELEPHONE (OUTPATIENT)
Dept: BARIATRICS/WEIGHT MGMT | Facility: CLINIC | Age: 82
End: 2022-11-22

## 2022-11-22 NOTE — TELEPHONE ENCOUNTER
Pt called, left VM. She is very frustrated and stated that it is cruel we make her wait this long to do something. Would like to know what her next steps are in regards to surgical planning. Pt is  Please advise, thanks!

## 2022-11-23 NOTE — TELEPHONE ENCOUNTER
No cardiac clearance is in her media or notes, checked messages at PAT, verbal clearance was given for EGD. I called pt, told her that we need written cardiac clearance from her cardiologist, gave pt fax # for them to fax this to. Pt understood with no further questions or concerns.

## 2022-12-01 ENCOUNTER — TELEPHONE (OUTPATIENT)
Dept: BARIATRICS/WEIGHT MGMT | Facility: CLINIC | Age: 82
End: 2022-12-01

## 2022-12-01 NOTE — TELEPHONE ENCOUNTER
Pt called, cardiac clearance has been scanned in and given to you to review. Pt is very anxious to get scheduled. Please advise, thanks!

## 2022-12-05 NOTE — TELEPHONE ENCOUNTER
Called pt, advised that this cardiac clearance only gives clearance for EGD not ABGR, will need to speak with PCP to get this changed ASAP. Pt stated that she would read the letter herself and if she thinks it doesn't read the way she thinks it should she will speak with her PCP.

## 2022-12-07 ENCOUNTER — OFFICE VISIT (OUTPATIENT)
Dept: BARIATRICS/WEIGHT MGMT | Facility: CLINIC | Age: 82
End: 2022-12-07

## 2022-12-07 VITALS
HEIGHT: 60 IN | OXYGEN SATURATION: 95 % | TEMPERATURE: 97.8 F | WEIGHT: 274 LBS | BODY MASS INDEX: 53.79 KG/M2 | DIASTOLIC BLOOD PRESSURE: 90 MMHG | HEART RATE: 100 BPM | SYSTOLIC BLOOD PRESSURE: 138 MMHG

## 2022-12-07 DIAGNOSIS — K80.20 CALCULUS OF GALLBLADDER WITHOUT CHOLECYSTITIS WITHOUT OBSTRUCTION: ICD-10-CM

## 2022-12-07 DIAGNOSIS — K95.09 GASTRIC BAND SLIPPAGE: Primary | ICD-10-CM

## 2022-12-07 PROCEDURE — 99214 OFFICE O/P EST MOD 30 MIN: CPT | Performed by: PHYSICIAN ASSISTANT

## 2022-12-07 NOTE — PROGRESS NOTES
"[facility] Bariatric Surgery  [address]  [address]  [facility] [address] [phone]        Patient Name: Desirae Menchaca  YOB: 1940      Date of Visit: 12/07/2022        CC:  AGB eval - pursing band removal      HPI:  82 y.o. female s/p LAGB REG 11/2006 [facility].      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  s/p LIZA BSO omentectomy and AGB port removal 11/10/16 @[facility] - no residual CA.    LOV w/ GYN/ONC 7/21/22 - \"Patient presents for f/u of gyn malignancy (see details below). Patient recently went to outside clinic for CT and was unable to have the scan due to inability to obtain IV access. She called me before today's visit to notify me and stated that she would decline the scan anyway if her Ca125 was normal. She also states that due to impaired mobility, she declines pelvic exams and is followed by CT surveillance yearly. From the attempted sticks at the CT facility, she is very bruised on her arms. Furthermore, she reports difficulty swallowing, stating that she previously had a lap band procedure and feels this may be related to her band. Patient states she is unable to tolerate more than small amounts of food and feels she has a blockage. Also having heart burn. No VB, new pelvic pain, or changes in bowel or bladder. \"    CT ab/pel 7/29/22 @[facility] - \"Gastric lap band in similar position to comparison. Development of ill-defined soft tissue density adjacent to the gastric band along the lesser curvature of the stomach measuring approximately 3.6 x 2.6 cm (series 3 image 66). Connection tubing terminates in the anterior abdomen without subcutaneous access port. Development of ill-defined low-attenuation along the lesser curvature of the stomach, likely related to complication of adjacent lap band and less likely metastatic disease. \"    CT images reviewed by Dr. Carlson - concerning for band erosion, also noted " gallstones.      EGD 10/26/22 @[facility] w/ Dr. Carlson - findings: Chronic partial slip of the band without erosion appreciated.  Retained food material and liquid throughout the esophagus with mild dilatation and tortuosity.    Final Diagnosis   1.  GASTRIC ANTRUM BIOPSY:  Minimal chronic gastritis without activity.  Negative for intestinal metaplasia and dysplasia.  No Helicobacter pylori-like organisms identified on routine stains.    2.  DISTAL ESOPHAGUS BIOPSY:  Squamous mucosa with minimal increase in intraepithelial leukocytes without eosinophils.  Gastric cardia type mucosa with no significant histopathologic change.  Negative for intestinal metaplasia and dysplasia.    3.  MIDESOPHAGUS BIOPSY:  Squamous mucosa with mild basal layer hyperplasia.  Negative for intestinal metaplasia, dysplasia, intraepithelial eosinophils and glandular mucosa.          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      ----  Reports progressive/worsening dysphagia for several months.  Also w/ vomiting/regurgitation on a daily basis.  Has burning throat pain despite taking daily PPI.  Denies abd.pain.  No fevers/chills.      Has Cardiac Clearance 12/5/22 - low risk.    Denies ASA/NSAIDS/steroids/tobacco use/exposure.         Past Medical History:   Diagnosis Date   • Anxiety    • Arthritis    • Asthma    • Back pain    • Constipation    • Dermatitis     sees derm   • Endometrial cancer (HCC) 2016    s/p TAHBSO + chemo, follows @[facility]- was in port of lapband per pt   • Fibromyalgia    • GERD (gastroesophageal reflux disease)     on PPI   • Headache    • History of rheumatic fever    • Hyperlipidemia    • Hypertension    • Impaired mobility    • Morbid obesity (HCC)    • Murmur     just while pregnant   • Thyroid nodule     U/S, eval pending   • Vomiting     with lap band blockage   • Wears glasses      Past Surgical History:   Procedure Laterality  Date   • ANKLE SURGERY Left 1999    plate and 5 screws   • BREAST BIOPSY Right    • CATARACT EXTRACTION, BILATERAL Bilateral    • COLONOSCOPY     • DILATATION AND CURETTAGE      x2   • ENDOSCOPY N/A 10/26/2022    Procedure: ESOPHAGOGASTRODUODENOSCOPY WITH BIOPSY;  Surgeon: Sulaiman Carlson MD;  Location: [facility] ENDOSCOPY;  Service: General;  Laterality: N/A;   • LAPAROSCOPIC GASTRIC BANDING  2006    w/ Dr. Carlson   • TONSILLECTOMY     • TOTAL ABDOMINAL HYSTERECTOMY  2016    w/ omentectomy + AGB port removal @ - probable endometrioid adenocarcinoma   • TOTAL SHOULDER ARTHROPLASTY Bilateral    • VENOUS ACCESS DEVICE (PORT) REMOVAL           Current Outpatient Medications:   •  Ammonium Lactate 5 % lotion, Every 12 (Twelve) Hours. Every 12 (Twelve) Hours., Disp: , Rfl:   •  benazepril (LOTENSIN) 40 MG tablet, benazepril 40 mg tablet  TAKE ONE TABLET BY MOUTH DAILY, Disp: , Rfl:   •  betamethasone, augmented, (DIPROLENE) 0.05 % cream, betamethasone, augmented 0.05 % topical cream  Apply to the palms and fingers twce a day for 2 weeks, then 2 times a week, if needed., Disp: , Rfl:   •  busPIRone (BUSPAR) 10 MG tablet, Every 12 (Twelve) Hours., Disp: , Rfl:   •  clobetasol (TEMOVATE) 0.05 % external solution, clobetasol 0.05 % scalp solution  Apply a thin layer to the affected areas on the scalp, nightly 2- 3 times a week, if needed., Disp: , Rfl:   •  Diclofenac Sodium (VOLTAREN) 1 % gel gel, Voltaren 1 % topical gel  0.5 gm 2x/day, Disp: , Rfl:   •  DULoxetine (CYMBALTA) 60 MG capsule, TAKE TWO CAPSULES BY MOUTH DAILY, Disp: 180 capsule, Rfl: 3  •  furosemide (LASIX) 20 MG tablet, Take 20 mg by mouth Daily As Needed., Disp: , Rfl:   •  mupirocin (BACTROBAN) 2 % ointment, Apply  topically to the appropriate area as directed 2 (Two) Times a Day., Disp: 15 g, Rfl: 0  •  omeprazole (priLOSEC) 40 MG capsule, 1 capsule Daily., Disp: , Rfl:   •  tiZANidine (ZANAFLEX) 4 MG tablet, TAKE ONE TABLET BY MOUTH TWICE A DAY,  "Disp: 180 tablet, Rfl: 3  •  traZODone (DESYREL) 50 MG tablet, , Disp: , Rfl:   •  triamterene-hydrochlorothiazide (MAXZIDE-25) 37.5-25 MG per tablet, Daily., Disp: , Rfl:   •  zolpidem (AMBIEN) 10 MG tablet, Take 10 mg by mouth At Night As Needed for Sleep., Disp: , Rfl:     Allergies   Allergen Reactions   • Dexamethasone Swelling   • Ciprofloxacin Unknown - Low Severity   • Risperidone Unknown - Low Severity     Social History     Tobacco Use   • Smoking status: Never   Vaping Use   • Vaping Use: Never used   Substance Use Topics   • Alcohol use: Yes     Alcohol/week: 10.0 standard drinks     Types: 10 Glasses of wine per week   • Drug use: Never       Social History     Social History Narrative    .  Lives in [place].           Review of Systems  As above.      /90 (BP Location: Left arm, Patient Position: Sitting, Cuff Size: Adult)   Pulse 100   Temp 97.8 °F (36.6 °C) (Infrared)   Ht 152.4 cm (60\")   Wt 124 kg (274 lb)   SpO2 95%   BMI 53.51 kg/m²     Physical Exam  Constitutional:       Appearance: She is well-developed.      Comments: wearing a mask   Eyes:      General: No scleral icterus.  Cardiovascular:      Rate and Rhythm: Tachycardia present.   Pulmonary:      Effort: Pulmonary effort is normal.   Abdominal:      Palpations: Abdomen is soft.      Tenderness: There is no abdominal tenderness.      Hernia: No hernia is present.   Musculoskeletal:      Comments: in a wheelchair   Skin:     General: Skin is warm and dry.      Findings: No rash.   Neurological:      Mental Status: She is alert.   Psychiatric:         Behavior: Behavior is cooperative.         Judgment: Judgment normal.           Assessment:   82 y.o. female s/p LAGB REG 11/2006 [facility], s/p port removal 2016 w/ GYN surgery @[facility]      ICD-10-CM ICD-9-CM   1. Gastric band slippage  K95.09 996.59   2. Calculus of gallbladder without cholecystitis without obstruction  K80.20 574.20         Plan:  Will schedule Laparoscopic " Lapband Removal w/ Laparoscopic Cholecystectomy, possible IOC @[facility] w/ Dr. Carlson - observation/overnight stay.  Risks/benefits discussed.  Avoid ASA/NSAIDS x 1 week prior.  Patient eager to proceed.  Call w/ issues/concerns.         FISH Bermudez

## 2022-12-07 NOTE — H&P (VIEW-ONLY)
"[facility] Bariatric Surgery  [address]  [address]  [facility] [address] [phone]        Patient Name: Desirae Menchaca  YOB: 1940      Date of Visit: 12/07/2022        CC:  AGB eval - pursing band removal      HPI:  82 y.o. female s/p LAGB REG 11/2006 [facility].      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  s/p LIZA BSO omentectomy and AGB port removal 11/10/16 @[facility] - no residual CA.    LOV w/ GYN/ONC 7/21/22 - \"Patient presents for f/u of gyn malignancy (see details below). Patient recently went to outside clinic for CT and was unable to have the scan due to inability to obtain IV access. She called me before today's visit to notify me and stated that she would decline the scan anyway if her Ca125 was normal. She also states that due to impaired mobility, she declines pelvic exams and is followed by CT surveillance yearly. From the attempted sticks at the CT facility, she is very bruised on her arms. Furthermore, she reports difficulty swallowing, stating that she previously had a lap band procedure and feels this may be related to her band. Patient states she is unable to tolerate more than small amounts of food and feels she has a blockage. Also having heart burn. No VB, new pelvic pain, or changes in bowel or bladder. \"    CT ab/pel 7/29/22 @[facility] - \"Gastric lap band in similar position to comparison. Development of ill-defined soft tissue density adjacent to the gastric band along the lesser curvature of the stomach measuring approximately 3.6 x 2.6 cm (series 3 image 66). Connection tubing terminates in the anterior abdomen without subcutaneous access port. Development of ill-defined low-attenuation along the lesser curvature of the stomach, likely related to complication of adjacent lap band and less likely metastatic disease. \"    CT images reviewed by Dr. Carlson - concerning for band erosion, also noted " gallstones.      EGD 10/26/22 @[facility] w/ Dr. Carlson - findings: Chronic partial slip of the band without erosion appreciated.  Retained food material and liquid throughout the esophagus with mild dilatation and tortuosity.    Final Diagnosis   1.  GASTRIC ANTRUM BIOPSY:  Minimal chronic gastritis without activity.  Negative for intestinal metaplasia and dysplasia.  No Helicobacter pylori-like organisms identified on routine stains.    2.  DISTAL ESOPHAGUS BIOPSY:  Squamous mucosa with minimal increase in intraepithelial leukocytes without eosinophils.  Gastric cardia type mucosa with no significant histopathologic change.  Negative for intestinal metaplasia and dysplasia.    3.  MIDESOPHAGUS BIOPSY:  Squamous mucosa with mild basal layer hyperplasia.  Negative for intestinal metaplasia, dysplasia, intraepithelial eosinophils and glandular mucosa.          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      ----  Reports progressive/worsening dysphagia for several months.  Also w/ vomiting/regurgitation on a daily basis.  Has burning throat pain despite taking daily PPI.  Denies abd.pain.  No fevers/chills.      Has Cardiac Clearance 12/5/22 - low risk.    Denies ASA/NSAIDS/steroids/tobacco use/exposure.         Past Medical History:   Diagnosis Date   • Anxiety    • Arthritis    • Asthma    • Back pain    • Constipation    • Dermatitis     sees derm   • Endometrial cancer (HCC) 2016    s/p TAHBSO + chemo, follows @[facility]- was in port of lapband per pt   • Fibromyalgia    • GERD (gastroesophageal reflux disease)     on PPI   • Headache    • History of rheumatic fever    • Hyperlipidemia    • Hypertension    • Impaired mobility    • Morbid obesity (HCC)    • Murmur     just while pregnant   • Thyroid nodule     U/S, eval pending   • Vomiting     with lap band blockage   • Wears glasses      Past Surgical History:   Procedure Laterality  Date   • ANKLE SURGERY Left 1999    plate and 5 screws   • BREAST BIOPSY Right    • CATARACT EXTRACTION, BILATERAL Bilateral    • COLONOSCOPY     • DILATATION AND CURETTAGE      x2   • ENDOSCOPY N/A 10/26/2022    Procedure: ESOPHAGOGASTRODUODENOSCOPY WITH BIOPSY;  Surgeon: Sulaiman Carlson MD;  Location: [facility] ENDOSCOPY;  Service: General;  Laterality: N/A;   • LAPAROSCOPIC GASTRIC BANDING  2006    w/ Dr. Carlson   • TONSILLECTOMY     • TOTAL ABDOMINAL HYSTERECTOMY  2016    w/ omentectomy + AGB port removal @ - probable endometrioid adenocarcinoma   • TOTAL SHOULDER ARTHROPLASTY Bilateral    • VENOUS ACCESS DEVICE (PORT) REMOVAL           Current Outpatient Medications:   •  Ammonium Lactate 5 % lotion, Every 12 (Twelve) Hours. Every 12 (Twelve) Hours., Disp: , Rfl:   •  benazepril (LOTENSIN) 40 MG tablet, benazepril 40 mg tablet  TAKE ONE TABLET BY MOUTH DAILY, Disp: , Rfl:   •  betamethasone, augmented, (DIPROLENE) 0.05 % cream, betamethasone, augmented 0.05 % topical cream  Apply to the palms and fingers twce a day for 2 weeks, then 2 times a week, if needed., Disp: , Rfl:   •  busPIRone (BUSPAR) 10 MG tablet, Every 12 (Twelve) Hours., Disp: , Rfl:   •  clobetasol (TEMOVATE) 0.05 % external solution, clobetasol 0.05 % scalp solution  Apply a thin layer to the affected areas on the scalp, nightly 2- 3 times a week, if needed., Disp: , Rfl:   •  Diclofenac Sodium (VOLTAREN) 1 % gel gel, Voltaren 1 % topical gel  0.5 gm 2x/day, Disp: , Rfl:   •  DULoxetine (CYMBALTA) 60 MG capsule, TAKE TWO CAPSULES BY MOUTH DAILY, Disp: 180 capsule, Rfl: 3  •  furosemide (LASIX) 20 MG tablet, Take 20 mg by mouth Daily As Needed., Disp: , Rfl:   •  mupirocin (BACTROBAN) 2 % ointment, Apply  topically to the appropriate area as directed 2 (Two) Times a Day., Disp: 15 g, Rfl: 0  •  omeprazole (priLOSEC) 40 MG capsule, 1 capsule Daily., Disp: , Rfl:   •  tiZANidine (ZANAFLEX) 4 MG tablet, TAKE ONE TABLET BY MOUTH TWICE A DAY,  "Disp: 180 tablet, Rfl: 3  •  traZODone (DESYREL) 50 MG tablet, , Disp: , Rfl:   •  triamterene-hydrochlorothiazide (MAXZIDE-25) 37.5-25 MG per tablet, Daily., Disp: , Rfl:   •  zolpidem (AMBIEN) 10 MG tablet, Take 10 mg by mouth At Night As Needed for Sleep., Disp: , Rfl:     Allergies   Allergen Reactions   • Dexamethasone Swelling   • Ciprofloxacin Unknown - Low Severity   • Risperidone Unknown - Low Severity     Social History     Tobacco Use   • Smoking status: Never   Vaping Use   • Vaping Use: Never used   Substance Use Topics   • Alcohol use: Yes     Alcohol/week: 10.0 standard drinks     Types: 10 Glasses of wine per week   • Drug use: Never       Social History     Social History Narrative    .  Lives in [place].           Review of Systems  As above.      /90 (BP Location: Left arm, Patient Position: Sitting, Cuff Size: Adult)   Pulse 100   Temp 97.8 °F (36.6 °C) (Infrared)   Ht 152.4 cm (60\")   Wt 124 kg (274 lb)   SpO2 95%   BMI 53.51 kg/m²     Physical Exam  Constitutional:       Appearance: She is well-developed.      Comments: wearing a mask   Eyes:      General: No scleral icterus.  Cardiovascular:      Rate and Rhythm: Tachycardia present.   Pulmonary:      Effort: Pulmonary effort is normal.   Abdominal:      Palpations: Abdomen is soft.      Tenderness: There is no abdominal tenderness.      Hernia: No hernia is present.   Musculoskeletal:      Comments: in a wheelchair   Skin:     General: Skin is warm and dry.      Findings: No rash.   Neurological:      Mental Status: She is alert.   Psychiatric:         Behavior: Behavior is cooperative.         Judgment: Judgment normal.           Assessment:   82 y.o. female s/p LAGB REG 11/2006 [facility], s/p port removal 2016 w/ GYN surgery @[facility]      ICD-10-CM ICD-9-CM   1. Gastric band slippage  K95.09 996.59   2. Calculus of gallbladder without cholecystitis without obstruction  K80.20 574.20         Plan:  Will schedule Laparoscopic " Lapband Removal w/ Laparoscopic Cholecystectomy, possible IOC @[facility] w/ Dr. Carlson - observation/overnight stay.  Risks/benefits discussed.  Avoid ASA/NSAIDS x 1 week prior.  Patient eager to proceed.  Call w/ issues/concerns.         FISH Bermudez

## 2022-12-15 RX ORDER — ENOXAPARIN SODIUM 150 MG/ML
40 INJECTION SUBCUTANEOUS ONCE
Status: CANCELLED | OUTPATIENT
Start: 2022-12-15 | End: 2022-12-15

## 2022-12-15 RX ORDER — SODIUM CHLORIDE 9 MG/ML
150 INJECTION, SOLUTION INTRAVENOUS CONTINUOUS
Status: CANCELLED | OUTPATIENT
Start: 2022-12-15

## 2022-12-15 RX ORDER — SCOLOPAMINE TRANSDERMAL SYSTEM 1 MG/1
1 PATCH, EXTENDED RELEASE TRANSDERMAL ONCE
Status: CANCELLED | OUTPATIENT
Start: 2022-12-15 | End: 2022-12-15

## 2022-12-15 RX ORDER — SODIUM CHLORIDE 9 MG/ML
40 INJECTION, SOLUTION INTRAVENOUS AS NEEDED
Status: CANCELLED | OUTPATIENT
Start: 2022-12-15

## 2022-12-15 RX ORDER — SODIUM CHLORIDE 0.9 % (FLUSH) 0.9 %
3 SYRINGE (ML) INJECTION EVERY 12 HOURS SCHEDULED
Status: CANCELLED | OUTPATIENT
Start: 2022-12-15

## 2022-12-15 RX ORDER — SODIUM CHLORIDE 0.9 % (FLUSH) 0.9 %
3-10 SYRINGE (ML) INJECTION AS NEEDED
Status: CANCELLED | OUTPATIENT
Start: 2022-12-15

## 2022-12-16 ENCOUNTER — PRE-ADMISSION TESTING (OUTPATIENT)
Dept: PREADMISSION TESTING | Facility: HOSPITAL | Age: 82
End: 2022-12-16

## 2022-12-16 DIAGNOSIS — K80.20 CALCULUS OF GALLBLADDER WITHOUT CHOLECYSTITIS WITHOUT OBSTRUCTION: ICD-10-CM

## 2022-12-16 DIAGNOSIS — K95.09 GASTRIC BAND SLIPPAGE: ICD-10-CM

## 2022-12-16 LAB
DEPRECATED RDW RBC AUTO: 42.9 FL (ref 37–54)
ERYTHROCYTE [DISTWIDTH] IN BLOOD BY AUTOMATED COUNT: 13.2 % (ref 12.3–15.4)
HBA1C MFR BLD: 5.6 % (ref 4.8–5.6)
HCT VFR BLD AUTO: 41.7 % (ref 34–46.6)
HGB BLD-MCNC: 13.5 G/DL (ref 12–15.9)
MCH RBC QN AUTO: 29 PG (ref 26.6–33)
MCHC RBC AUTO-ENTMCNC: 32.4 G/DL (ref 31.5–35.7)
MCV RBC AUTO: 89.5 FL (ref 79–97)
PLATELET # BLD AUTO: 339 10*3/MM3 (ref 140–450)
PMV BLD AUTO: 10 FL (ref 6–12)
POTASSIUM SERPL-SCNC: 4.6 MMOL/L (ref 3.5–5.2)
RBC # BLD AUTO: 4.66 10*6/MM3 (ref 3.77–5.28)
WBC NRBC COR # BLD: 8.3 10*3/MM3 (ref 3.4–10.8)

## 2022-12-16 PROCEDURE — 87081 CULTURE SCREEN ONLY: CPT

## 2022-12-16 PROCEDURE — 85027 COMPLETE CBC AUTOMATED: CPT

## 2022-12-16 PROCEDURE — 36415 COLL VENOUS BLD VENIPUNCTURE: CPT

## 2022-12-16 PROCEDURE — 84132 ASSAY OF SERUM POTASSIUM: CPT

## 2022-12-16 PROCEDURE — 83036 HEMOGLOBIN GLYCOSYLATED A1C: CPT

## 2022-12-16 NOTE — PAT
ekg on chart from 10/26/22 with clearance on chart.     Patient viewed general PAT education video as instructed in their preoperative information received from their surgeon.  Patient stated the general PAT education video was viewed in its entirety and survey completed.  Copies of [facility] general education handouts (Incentive Spirometry, Meds to Beds Program, Patient Belongings, Pre-op skin preparation instructions, Blood Glucose testing, Visitor policy, Surgery FAQ, Code H) distributed to patient if not printed. Education related to the PAT pass and skin preparation for surgery (if applicable) completed in PAT as a reinforcement to PAT education video. Patient instructed to return PAT pass provided today as well as completed skin preparation sheet (if applicable) on the day of procedure.     Additionally if patient had not viewed video yet but intended to view it at home or in our waiting area, then referred them to the handout with QR code/link provided during PAT visit.  Instructed patient to complete survey after viewing the video in its entirety.  Encouraged patient/family to read PAT general education handouts thoroughly and notify PAT staff with any questions or concerns. Patient verbalized understanding of all information and priority content.    Patient instructed to drink 20 ounces of Gatorade and it needs to be completed 1 hour (for Main OR patients) or 2 hours (scheduled  section & [facility] patients) before given arrival time for procedure (NO RED Gatorade)    Patient verbalized understanding.    Per Anesthesia Request, patient instructed not to take their ACE/ARB medications on the AM of surgery.    Patient to apply Chlorhexadine wipes  to surgical area (as instructed) the night before procedure and the AM of procedure. Wipes provided.

## 2022-12-17 LAB — MRSA SPEC QL CULT: NORMAL

## 2022-12-21 ENCOUNTER — HOSPITAL ENCOUNTER (OUTPATIENT)
Facility: HOSPITAL | Age: 82
LOS: 1 days | Discharge: HOME | End: 2022-12-22
Attending: SURGERY | Admitting: SURGERY

## 2022-12-21 ENCOUNTER — ANESTHESIA EVENT (OUTPATIENT)
Dept: PERIOP | Facility: HOSPITAL | Age: 82
End: 2022-12-21

## 2022-12-21 ENCOUNTER — ANESTHESIA EVENT CONVERTED (OUTPATIENT)
Dept: ANESTHESIOLOGY | Facility: HOSPITAL | Age: 82
End: 2022-12-21

## 2022-12-21 ENCOUNTER — ANESTHESIA (OUTPATIENT)
Dept: PERIOP | Facility: HOSPITAL | Age: 82
End: 2022-12-21

## 2022-12-21 ENCOUNTER — APPOINTMENT (OUTPATIENT)
Dept: GENERAL RADIOLOGY | Facility: HOSPITAL | Age: 82
End: 2022-12-21

## 2022-12-21 DIAGNOSIS — Z74.09 IMPAIRED MOBILITY: ICD-10-CM

## 2022-12-21 DIAGNOSIS — K95.09 COMPLICATION OF GASTRIC BANDING: ICD-10-CM

## 2022-12-21 DIAGNOSIS — K80.20 CALCULUS OF GALLBLADDER WITHOUT CHOLECYSTITIS WITHOUT OBSTRUCTION: ICD-10-CM

## 2022-12-21 DIAGNOSIS — E66.01 MORBID OBESITY: Primary | ICD-10-CM

## 2022-12-21 DIAGNOSIS — M79.7 FIBROMYALGIA: ICD-10-CM

## 2022-12-21 DIAGNOSIS — K95.09 GASTRIC BAND SLIPPAGE: ICD-10-CM

## 2022-12-21 PROCEDURE — 25010000002 FENTANYL CITRATE (PF) 100 MCG/2ML SOLUTION: Performed by: NURSE ANESTHETIST, CERTIFIED REGISTERED

## 2022-12-21 PROCEDURE — 43774 LAP RMVL GASTR ADJ ALL PARTS: CPT | Performed by: SURGERY

## 2022-12-21 PROCEDURE — 25010000002 HYDROMORPHONE 1 MG/ML SOLUTION

## 2022-12-21 PROCEDURE — 47562 LAPAROSCOPIC CHOLECYSTECTOMY: CPT | Performed by: SURGERY

## 2022-12-21 PROCEDURE — 88304 TISSUE EXAM BY PATHOLOGIST: CPT | Performed by: SURGERY

## 2022-12-21 PROCEDURE — 25010000002 DEXAMETHASONE SODIUM PHOSPHATE 10 MG/ML SOLUTION: Performed by: NURSE ANESTHETIST, CERTIFIED REGISTERED

## 2022-12-21 PROCEDURE — 0 POTASSIUM CHLORIDE PER 2 MEQ: Performed by: SURGERY

## 2022-12-21 PROCEDURE — 99212 OFFICE O/P EST SF 10 MIN: CPT | Performed by: NURSE PRACTITIONER

## 2022-12-21 PROCEDURE — 25010000002 CEFAZOLIN PER 500 MG: Performed by: SURGERY

## 2022-12-21 PROCEDURE — 25010000002 ENOXAPARIN PER 10 MG: Performed by: SURGERY

## 2022-12-21 PROCEDURE — 25010000002 CEFAZOLIN PER 500 MG: Performed by: PHYSICIAN ASSISTANT

## 2022-12-21 PROCEDURE — 94761 N-INVAS EAR/PLS OXIMETRY MLT: CPT

## 2022-12-21 PROCEDURE — 94799 UNLISTED PULMONARY SVC/PX: CPT

## 2022-12-21 PROCEDURE — G0378 HOSPITAL OBSERVATION PER HR: HCPCS

## 2022-12-21 PROCEDURE — 25010000002 PROPOFOL 10 MG/ML EMULSION: Performed by: NURSE ANESTHETIST, CERTIFIED REGISTERED

## 2022-12-21 PROCEDURE — 25010000002 ONDANSETRON PER 1 MG: Performed by: NURSE ANESTHETIST, CERTIFIED REGISTERED

## 2022-12-21 PROCEDURE — 99024 POSTOP FOLLOW-UP VISIT: CPT | Performed by: SURGERY

## 2022-12-21 PROCEDURE — 94640 AIRWAY INHALATION TREATMENT: CPT

## 2022-12-21 PROCEDURE — 88300 SURGICAL PATH GROSS: CPT | Performed by: SURGERY

## 2022-12-21 DEVICE — FLOSEAL HEMOSTATIC MATRIX, 10ML
Type: IMPLANTABLE DEVICE | Site: ABDOMEN | Status: FUNCTIONAL
Brand: FLOSEAL HEMOSTATIC MATRIX

## 2022-12-21 DEVICE — LIGAMAX 5 MM ENDOSCOPIC MULTIPLE CLIP APPLIER
Type: IMPLANTABLE DEVICE | Site: ABDOMEN | Status: FUNCTIONAL
Brand: LIGAMAX

## 2022-12-21 RX ORDER — TRIAMTERENE AND HYDROCHLOROTHIAZIDE 37.5; 25 MG/1; MG/1
1 TABLET ORAL DAILY
Status: DISCONTINUED | OUTPATIENT
Start: 2022-12-21 | End: 2022-12-22

## 2022-12-21 RX ORDER — ZOLPIDEM TARTRATE 5 MG/1
10 TABLET ORAL NIGHTLY PRN
Status: DISCONTINUED | OUTPATIENT
Start: 2022-12-21 | End: 2022-12-22 | Stop reason: HOSPADM

## 2022-12-21 RX ORDER — DROPERIDOL 2.5 MG/ML
0.62 INJECTION, SOLUTION INTRAMUSCULAR; INTRAVENOUS ONCE AS NEEDED
Status: DISCONTINUED | OUTPATIENT
Start: 2022-12-21 | End: 2022-12-21 | Stop reason: HOSPADM

## 2022-12-21 RX ORDER — CEFAZOLIN SODIUM IN 0.9 % NACL 3 G/100 ML
3 INTRAVENOUS SOLUTION, PIGGYBACK (ML) INTRAVENOUS EVERY 8 HOURS
Status: COMPLETED | OUTPATIENT
Start: 2022-12-21 | End: 2022-12-21

## 2022-12-21 RX ORDER — SODIUM CHLORIDE 9 MG/ML
INJECTION, SOLUTION INTRAVENOUS AS NEEDED
Status: DISCONTINUED | OUTPATIENT
Start: 2022-12-21 | End: 2022-12-21 | Stop reason: HOSPADM

## 2022-12-21 RX ORDER — LIDOCAINE HYDROCHLORIDE 10 MG/ML
INJECTION, SOLUTION EPIDURAL; INFILTRATION; INTRACAUDAL; PERINEURAL AS NEEDED
Status: DISCONTINUED | OUTPATIENT
Start: 2022-12-21 | End: 2022-12-21 | Stop reason: SURG

## 2022-12-21 RX ORDER — FAMOTIDINE 20 MG/1
20 TABLET, FILM COATED ORAL
Status: DISCONTINUED | OUTPATIENT
Start: 2022-12-21 | End: 2022-12-21

## 2022-12-21 RX ORDER — LORAZEPAM 2 MG/ML
0.5 INJECTION INTRAMUSCULAR EVERY 12 HOURS PRN
Status: DISCONTINUED | OUTPATIENT
Start: 2022-12-21 | End: 2022-12-22 | Stop reason: HOSPADM

## 2022-12-21 RX ORDER — FENTANYL CITRATE 50 UG/ML
INJECTION, SOLUTION INTRAMUSCULAR; INTRAVENOUS AS NEEDED
Status: DISCONTINUED | OUTPATIENT
Start: 2022-12-21 | End: 2022-12-21 | Stop reason: SURG

## 2022-12-21 RX ORDER — TIZANIDINE 4 MG/1
4 TABLET ORAL 2 TIMES DAILY
Status: DISCONTINUED | OUTPATIENT
Start: 2022-12-21 | End: 2022-12-22 | Stop reason: HOSPADM

## 2022-12-21 RX ORDER — DIPHENHYDRAMINE HYDROCHLORIDE 50 MG/ML
25 INJECTION INTRAMUSCULAR; INTRAVENOUS EVERY 4 HOURS PRN
Status: DISCONTINUED | OUTPATIENT
Start: 2022-12-21 | End: 2022-12-22 | Stop reason: HOSPADM

## 2022-12-21 RX ORDER — DEXMEDETOMIDINE HYDROCHLORIDE 100 UG/ML
INJECTION, SOLUTION INTRAVENOUS AS NEEDED
Status: DISCONTINUED | OUTPATIENT
Start: 2022-12-21 | End: 2022-12-21 | Stop reason: SURG

## 2022-12-21 RX ORDER — DULOXETIN HYDROCHLORIDE 60 MG/1
120 CAPSULE, DELAYED RELEASE ORAL DAILY
Status: DISCONTINUED | OUTPATIENT
Start: 2022-12-22 | End: 2022-12-22 | Stop reason: HOSPADM

## 2022-12-21 RX ORDER — SODIUM CHLORIDE 0.9 % (FLUSH) 0.9 %
3 SYRINGE (ML) INJECTION EVERY 12 HOURS SCHEDULED
Status: DISCONTINUED | OUTPATIENT
Start: 2022-12-21 | End: 2022-12-21 | Stop reason: HOSPADM

## 2022-12-21 RX ORDER — IPRATROPIUM BROMIDE AND ALBUTEROL SULFATE 2.5; .5 MG/3ML; MG/3ML
3 SOLUTION RESPIRATORY (INHALATION) ONCE AS NEEDED
Status: DISCONTINUED | OUTPATIENT
Start: 2022-12-21 | End: 2022-12-21 | Stop reason: HOSPADM

## 2022-12-21 RX ORDER — PANTOPRAZOLE SODIUM 40 MG/1
40 TABLET, DELAYED RELEASE ORAL EVERY MORNING
Status: DISCONTINUED | OUTPATIENT
Start: 2022-12-22 | End: 2022-12-22 | Stop reason: HOSPADM

## 2022-12-21 RX ORDER — SODIUM CHLORIDE 0.9 % (FLUSH) 0.9 %
3-10 SYRINGE (ML) INJECTION AS NEEDED
Status: DISCONTINUED | OUTPATIENT
Start: 2022-12-21 | End: 2022-12-21 | Stop reason: HOSPADM

## 2022-12-21 RX ORDER — LABETALOL HYDROCHLORIDE 5 MG/ML
5 INJECTION, SOLUTION INTRAVENOUS
Status: DISCONTINUED | OUTPATIENT
Start: 2022-12-21 | End: 2022-12-21 | Stop reason: HOSPADM

## 2022-12-21 RX ORDER — PROMETHAZINE HYDROCHLORIDE 25 MG/1
25 TABLET ORAL ONCE AS NEEDED
Status: DISCONTINUED | OUTPATIENT
Start: 2022-12-21 | End: 2022-12-21 | Stop reason: HOSPADM

## 2022-12-21 RX ORDER — LIDOCAINE HYDROCHLORIDE 10 MG/ML
0.5 INJECTION, SOLUTION EPIDURAL; INFILTRATION; INTRACAUDAL; PERINEURAL ONCE AS NEEDED
Status: COMPLETED | OUTPATIENT
Start: 2022-12-21 | End: 2022-12-21

## 2022-12-21 RX ORDER — HYDROMORPHONE HYDROCHLORIDE 1 MG/ML
0.5 INJECTION, SOLUTION INTRAMUSCULAR; INTRAVENOUS; SUBCUTANEOUS
Status: DISCONTINUED | OUTPATIENT
Start: 2022-12-21 | End: 2022-12-21 | Stop reason: HOSPADM

## 2022-12-21 RX ORDER — ALPRAZOLAM 0.25 MG/1
0.25 TABLET ORAL ONCE AS NEEDED
Status: DISCONTINUED | OUTPATIENT
Start: 2022-12-21 | End: 2022-12-22 | Stop reason: HOSPADM

## 2022-12-21 RX ORDER — FENTANYL CITRATE 50 UG/ML
50 INJECTION, SOLUTION INTRAMUSCULAR; INTRAVENOUS
Status: DISCONTINUED | OUTPATIENT
Start: 2022-12-21 | End: 2022-12-21 | Stop reason: HOSPADM

## 2022-12-21 RX ORDER — MEPERIDINE HYDROCHLORIDE 25 MG/ML
12.5 INJECTION INTRAMUSCULAR; INTRAVENOUS; SUBCUTANEOUS
Status: DISCONTINUED | OUTPATIENT
Start: 2022-12-21 | End: 2022-12-21 | Stop reason: HOSPADM

## 2022-12-21 RX ORDER — ESMOLOL HYDROCHLORIDE 10 MG/ML
INJECTION INTRAVENOUS AS NEEDED
Status: DISCONTINUED | OUTPATIENT
Start: 2022-12-21 | End: 2022-12-21 | Stop reason: SURG

## 2022-12-21 RX ORDER — SODIUM CHLORIDE 9 MG/ML
40 INJECTION, SOLUTION INTRAVENOUS AS NEEDED
Status: DISCONTINUED | OUTPATIENT
Start: 2022-12-21 | End: 2022-12-21 | Stop reason: HOSPADM

## 2022-12-21 RX ORDER — SODIUM CHLORIDE 9 MG/ML
150 INJECTION, SOLUTION INTRAVENOUS CONTINUOUS
Status: DISCONTINUED | OUTPATIENT
Start: 2022-12-21 | End: 2022-12-21

## 2022-12-21 RX ORDER — DROPERIDOL 2.5 MG/ML
0.62 INJECTION, SOLUTION INTRAMUSCULAR; INTRAVENOUS
Status: DISCONTINUED | OUTPATIENT
Start: 2022-12-21 | End: 2022-12-21 | Stop reason: HOSPADM

## 2022-12-21 RX ORDER — ONDANSETRON 4 MG/1
4 TABLET, FILM COATED ORAL EVERY 4 HOURS PRN
Status: DISCONTINUED | OUTPATIENT
Start: 2022-12-21 | End: 2022-12-21 | Stop reason: SDUPTHER

## 2022-12-21 RX ORDER — ONDANSETRON 2 MG/ML
4 INJECTION INTRAMUSCULAR; INTRAVENOUS ONCE AS NEEDED
Status: DISCONTINUED | OUTPATIENT
Start: 2022-12-21 | End: 2022-12-21 | Stop reason: HOSPADM

## 2022-12-21 RX ORDER — SODIUM CHLORIDE 0.9 % (FLUSH) 0.9 %
10 SYRINGE (ML) INJECTION EVERY 12 HOURS SCHEDULED
Status: DISCONTINUED | OUTPATIENT
Start: 2022-12-21 | End: 2022-12-21 | Stop reason: HOSPADM

## 2022-12-21 RX ORDER — ONDANSETRON 2 MG/ML
INJECTION INTRAMUSCULAR; INTRAVENOUS AS NEEDED
Status: DISCONTINUED | OUTPATIENT
Start: 2022-12-21 | End: 2022-12-21 | Stop reason: SURG

## 2022-12-21 RX ORDER — PROPOFOL 10 MG/ML
VIAL (ML) INTRAVENOUS AS NEEDED
Status: DISCONTINUED | OUTPATIENT
Start: 2022-12-21 | End: 2022-12-21 | Stop reason: SURG

## 2022-12-21 RX ORDER — ENOXAPARIN SODIUM 100 MG/ML
INJECTION SUBCUTANEOUS AS NEEDED
Status: DISCONTINUED | OUTPATIENT
Start: 2022-12-21 | End: 2022-12-21 | Stop reason: HOSPADM

## 2022-12-21 RX ORDER — LISINOPRIL 40 MG/1
40 TABLET ORAL
Status: DISCONTINUED | OUTPATIENT
Start: 2022-12-21 | End: 2022-12-22 | Stop reason: HOSPADM

## 2022-12-21 RX ORDER — EPHEDRINE SULFATE 50 MG/ML
INJECTION INTRAVENOUS AS NEEDED
Status: DISCONTINUED | OUTPATIENT
Start: 2022-12-21 | End: 2022-12-21 | Stop reason: SURG

## 2022-12-21 RX ORDER — LORAZEPAM 1 MG/1
1 TABLET ORAL EVERY 12 HOURS PRN
Status: DISCONTINUED | OUTPATIENT
Start: 2022-12-21 | End: 2022-12-22 | Stop reason: HOSPADM

## 2022-12-21 RX ORDER — CEFAZOLIN SODIUM IN 0.9 % NACL 3 G/100 ML
3 INTRAVENOUS SOLUTION, PIGGYBACK (ML) INTRAVENOUS ONCE
Status: COMPLETED | OUTPATIENT
Start: 2022-12-21 | End: 2022-12-21

## 2022-12-21 RX ORDER — HYDRALAZINE HYDROCHLORIDE 20 MG/ML
5 INJECTION INTRAMUSCULAR; INTRAVENOUS
Status: DISCONTINUED | OUTPATIENT
Start: 2022-12-21 | End: 2022-12-21 | Stop reason: HOSPADM

## 2022-12-21 RX ORDER — ENOXAPARIN SODIUM 100 MG/ML
40 INJECTION SUBCUTANEOUS EVERY 12 HOURS
Status: DISCONTINUED | OUTPATIENT
Start: 2022-12-22 | End: 2022-12-22 | Stop reason: HOSPADM

## 2022-12-21 RX ORDER — BUSPIRONE HYDROCHLORIDE 10 MG/1
10 TABLET ORAL EVERY 12 HOURS SCHEDULED
Status: DISCONTINUED | OUTPATIENT
Start: 2022-12-21 | End: 2022-12-22 | Stop reason: HOSPADM

## 2022-12-21 RX ORDER — FUROSEMIDE 20 MG/1
20 TABLET ORAL DAILY PRN
Status: DISCONTINUED | OUTPATIENT
Start: 2022-12-21 | End: 2022-12-22 | Stop reason: HOSPADM

## 2022-12-21 RX ORDER — ENOXAPARIN SODIUM 100 MG/ML
40 INJECTION SUBCUTANEOUS ONCE
Status: DISCONTINUED | OUTPATIENT
Start: 2022-12-21 | End: 2022-12-21 | Stop reason: HOSPADM

## 2022-12-21 RX ORDER — TRANEXAMIC ACID 100 MG/ML
INJECTION, SOLUTION INTRAVENOUS AS NEEDED
Status: DISCONTINUED | OUTPATIENT
Start: 2022-12-21 | End: 2022-12-21 | Stop reason: SURG

## 2022-12-21 RX ORDER — ONDANSETRON 4 MG/1
4 TABLET, FILM COATED ORAL EVERY 6 HOURS PRN
Status: DISCONTINUED | OUTPATIENT
Start: 2022-12-25 | End: 2022-12-22 | Stop reason: HOSPADM

## 2022-12-21 RX ORDER — ACETAMINOPHEN 500 MG
1000 TABLET ORAL EVERY 8 HOURS PRN
Status: DISCONTINUED | OUTPATIENT
Start: 2022-12-21 | End: 2022-12-22 | Stop reason: HOSPADM

## 2022-12-21 RX ORDER — TRANEXAMIC ACID 10 MG/ML
1000 INJECTION, SOLUTION INTRAVENOUS ONCE
Status: DISCONTINUED | OUTPATIENT
Start: 2022-12-21 | End: 2022-12-21 | Stop reason: HOSPADM

## 2022-12-21 RX ORDER — LABETALOL HYDROCHLORIDE 5 MG/ML
INJECTION, SOLUTION INTRAVENOUS AS NEEDED
Status: DISCONTINUED | OUTPATIENT
Start: 2022-12-21 | End: 2022-12-21 | Stop reason: SURG

## 2022-12-21 RX ORDER — SIMETHICONE 80 MG
80 TABLET,CHEWABLE ORAL 4 TIMES DAILY PRN
Status: DISCONTINUED | OUTPATIENT
Start: 2022-12-21 | End: 2022-12-22 | Stop reason: HOSPADM

## 2022-12-21 RX ORDER — LIDOCAINE HYDROCHLORIDE 10 MG/ML
0.5 INJECTION, SOLUTION EPIDURAL; INFILTRATION; INTRACAUDAL; PERINEURAL ONCE AS NEEDED
Status: DISCONTINUED | OUTPATIENT
Start: 2022-12-21 | End: 2022-12-21 | Stop reason: HOSPADM

## 2022-12-21 RX ORDER — NALOXONE HCL 0.4 MG/ML
0.4 VIAL (ML) INJECTION
Status: DISCONTINUED | OUTPATIENT
Start: 2022-12-21 | End: 2022-12-22 | Stop reason: HOSPADM

## 2022-12-21 RX ORDER — FIBRINOGEN HUMAN AND THROMBIN HUMAN 90; 500 [IU]/ML; [IU]/ML
SOLUTION TOPICAL AS NEEDED
Status: DISCONTINUED | OUTPATIENT
Start: 2022-12-21 | End: 2022-12-21 | Stop reason: HOSPADM

## 2022-12-21 RX ORDER — SUCCINYLCHOLINE/SOD CL,ISO/PF 200MG/10ML
SYRINGE (ML) INTRAVENOUS AS NEEDED
Status: DISCONTINUED | OUTPATIENT
Start: 2022-12-21 | End: 2022-12-21 | Stop reason: SURG

## 2022-12-21 RX ORDER — NALOXONE HCL 0.4 MG/ML
0.4 VIAL (ML) INJECTION AS NEEDED
Status: DISCONTINUED | OUTPATIENT
Start: 2022-12-21 | End: 2022-12-21 | Stop reason: HOSPADM

## 2022-12-21 RX ORDER — ALBUTEROL SULFATE 2.5 MG/3ML
2.5 SOLUTION RESPIRATORY (INHALATION)
Status: DISCONTINUED | OUTPATIENT
Start: 2022-12-21 | End: 2022-12-22 | Stop reason: HOSPADM

## 2022-12-21 RX ORDER — FAMOTIDINE 10 MG/ML
20 INJECTION, SOLUTION INTRAVENOUS ONCE
Status: COMPLETED | OUTPATIENT
Start: 2022-12-21 | End: 2022-12-21

## 2022-12-21 RX ORDER — MORPHINE SULFATE 4 MG/ML
4 INJECTION, SOLUTION INTRAMUSCULAR; INTRAVENOUS
Status: DISCONTINUED | OUTPATIENT
Start: 2022-12-21 | End: 2022-12-22 | Stop reason: HOSPADM

## 2022-12-21 RX ORDER — SODIUM CHLORIDE AND POTASSIUM CHLORIDE 150; 450 MG/100ML; MG/100ML
75 INJECTION, SOLUTION INTRAVENOUS CONTINUOUS
Status: DISCONTINUED | OUTPATIENT
Start: 2022-12-21 | End: 2022-12-22 | Stop reason: HOSPADM

## 2022-12-21 RX ORDER — PROCHLORPERAZINE MALEATE 10 MG
10 TABLET ORAL EVERY 6 HOURS PRN
Status: DISCONTINUED | OUTPATIENT
Start: 2022-12-21 | End: 2022-12-22 | Stop reason: HOSPADM

## 2022-12-21 RX ORDER — MIDAZOLAM HYDROCHLORIDE 1 MG/ML
0.5 INJECTION INTRAMUSCULAR; INTRAVENOUS
Status: DISCONTINUED | OUTPATIENT
Start: 2022-12-21 | End: 2022-12-21 | Stop reason: HOSPADM

## 2022-12-21 RX ORDER — HYDROMORPHONE HYDROCHLORIDE 2 MG/1
2 TABLET ORAL EVERY 4 HOURS PRN
Status: DISCONTINUED | OUTPATIENT
Start: 2022-12-21 | End: 2022-12-22 | Stop reason: HOSPADM

## 2022-12-21 RX ORDER — OXYCODONE HYDROCHLORIDE 5 MG/1
5 TABLET ORAL EVERY 6 HOURS PRN
Status: DISCONTINUED | OUTPATIENT
Start: 2022-12-21 | End: 2022-12-22 | Stop reason: HOSPADM

## 2022-12-21 RX ORDER — ROCURONIUM BROMIDE 10 MG/ML
INJECTION, SOLUTION INTRAVENOUS AS NEEDED
Status: DISCONTINUED | OUTPATIENT
Start: 2022-12-21 | End: 2022-12-21 | Stop reason: SURG

## 2022-12-21 RX ORDER — SODIUM CHLORIDE, SODIUM LACTATE, POTASSIUM CHLORIDE, CALCIUM CHLORIDE 600; 310; 30; 20 MG/100ML; MG/100ML; MG/100ML; MG/100ML
9 INJECTION, SOLUTION INTRAVENOUS CONTINUOUS
Status: DISCONTINUED | OUTPATIENT
Start: 2022-12-21 | End: 2022-12-21

## 2022-12-21 RX ORDER — MAGNESIUM HYDROXIDE 1200 MG/15ML
LIQUID ORAL AS NEEDED
Status: DISCONTINUED | OUTPATIENT
Start: 2022-12-21 | End: 2022-12-21 | Stop reason: HOSPADM

## 2022-12-21 RX ORDER — SODIUM CHLORIDE 0.9 % (FLUSH) 0.9 %
10 SYRINGE (ML) INJECTION EVERY 12 HOURS SCHEDULED
Status: DISCONTINUED | OUTPATIENT
Start: 2022-12-21 | End: 2022-12-21 | Stop reason: SDUPTHER

## 2022-12-21 RX ORDER — PROMETHAZINE HYDROCHLORIDE 25 MG/1
25 SUPPOSITORY RECTAL ONCE AS NEEDED
Status: DISCONTINUED | OUTPATIENT
Start: 2022-12-21 | End: 2022-12-21 | Stop reason: HOSPADM

## 2022-12-21 RX ORDER — FAMOTIDINE 20 MG/1
20 TABLET, FILM COATED ORAL ONCE
Status: DISCONTINUED | OUTPATIENT
Start: 2022-12-21 | End: 2022-12-21 | Stop reason: HOSPADM

## 2022-12-21 RX ORDER — PHENYLEPHRINE HCL IN 0.9% NACL 1 MG/10 ML
SYRINGE (ML) INTRAVENOUS AS NEEDED
Status: DISCONTINUED | OUTPATIENT
Start: 2022-12-21 | End: 2022-12-21 | Stop reason: SURG

## 2022-12-21 RX ORDER — NALOXONE HCL 0.4 MG/ML
0.1 VIAL (ML) INJECTION
Status: DISCONTINUED | OUTPATIENT
Start: 2022-12-21 | End: 2022-12-22 | Stop reason: HOSPADM

## 2022-12-21 RX ORDER — ONDANSETRON 2 MG/ML
4 INJECTION INTRAMUSCULAR; INTRAVENOUS EVERY 6 HOURS PRN
Status: DISCONTINUED | OUTPATIENT
Start: 2022-12-21 | End: 2022-12-22 | Stop reason: HOSPADM

## 2022-12-21 RX ORDER — HYDROCODONE BITARTRATE AND ACETAMINOPHEN 5; 325 MG/1; MG/1
1 TABLET ORAL ONCE AS NEEDED
Status: DISCONTINUED | OUTPATIENT
Start: 2022-12-21 | End: 2022-12-21 | Stop reason: HOSPADM

## 2022-12-21 RX ORDER — BUPIVACAINE HYDROCHLORIDE 2.5 MG/ML
INJECTION, SOLUTION EPIDURAL; INFILTRATION; INTRACAUDAL
Status: COMPLETED | OUTPATIENT
Start: 2022-12-21 | End: 2022-12-21

## 2022-12-21 RX ORDER — PROMETHAZINE HYDROCHLORIDE 12.5 MG/1
12.5 TABLET ORAL EVERY 6 HOURS PRN
Status: DISCONTINUED | OUTPATIENT
Start: 2022-12-21 | End: 2022-12-22 | Stop reason: HOSPADM

## 2022-12-21 RX ORDER — FAMOTIDINE 10 MG/ML
20 INJECTION, SOLUTION INTRAVENOUS ONCE
Status: DISCONTINUED | OUTPATIENT
Start: 2022-12-21 | End: 2022-12-21 | Stop reason: SDUPTHER

## 2022-12-21 RX ORDER — DEXAMETHASONE SODIUM PHOSPHATE 10 MG/ML
INJECTION, SOLUTION INTRAMUSCULAR; INTRAVENOUS
Status: COMPLETED | OUTPATIENT
Start: 2022-12-21 | End: 2022-12-21

## 2022-12-21 RX ORDER — TRAZODONE HYDROCHLORIDE 50 MG/1
50 TABLET ORAL NIGHTLY PRN
Status: DISCONTINUED | OUTPATIENT
Start: 2022-12-21 | End: 2022-12-22 | Stop reason: HOSPADM

## 2022-12-21 RX ORDER — SODIUM CHLORIDE 0.9 % (FLUSH) 0.9 %
10 SYRINGE (ML) INJECTION AS NEEDED
Status: DISCONTINUED | OUTPATIENT
Start: 2022-12-21 | End: 2022-12-21 | Stop reason: HOSPADM

## 2022-12-21 RX ADMIN — DEXMEDETOMIDINE HYDROCHLORIDE 8 MCG: 100 INJECTION, SOLUTION INTRAVENOUS at 08:07

## 2022-12-21 RX ADMIN — DEXMEDETOMIDINE HYDROCHLORIDE 8 MCG: 100 INJECTION, SOLUTION INTRAVENOUS at 08:35

## 2022-12-21 RX ADMIN — HYDROMORPHONE HYDROCHLORIDE 0.5 MG: 1 INJECTION, SOLUTION INTRAMUSCULAR; INTRAVENOUS; SUBCUTANEOUS at 10:18

## 2022-12-21 RX ADMIN — Medication 100 MCG: at 07:45

## 2022-12-21 RX ADMIN — LIDOCAINE HYDROCHLORIDE 0.5 ML: 10 INJECTION, SOLUTION EPIDURAL; INFILTRATION; INTRACAUDAL; PERINEURAL at 07:15

## 2022-12-21 RX ADMIN — OXYCODONE 5 MG: 5 TABLET ORAL at 14:01

## 2022-12-21 RX ADMIN — TRIAMTERENE AND HYDROCHLOROTHIAZIDE 1 TABLET: 37.5; 25 TABLET ORAL at 12:33

## 2022-12-21 RX ADMIN — EPHEDRINE SULFATE 10 MG: 50 INJECTION INTRAVENOUS at 08:47

## 2022-12-21 RX ADMIN — SODIUM CHLORIDE: 9 INJECTION, SOLUTION INTRAVENOUS at 07:31

## 2022-12-21 RX ADMIN — ONDANSETRON 4 MG: 2 INJECTION INTRAMUSCULAR; INTRAVENOUS at 09:03

## 2022-12-21 RX ADMIN — BUSPIRONE HYDROCHLORIDE 10 MG: 10 TABLET ORAL at 12:34

## 2022-12-21 RX ADMIN — CEFAZOLIN 3 G: 10 INJECTION, POWDER, FOR SOLUTION INTRAVENOUS at 14:48

## 2022-12-21 RX ADMIN — Medication 180 MG: at 07:35

## 2022-12-21 RX ADMIN — DEXMEDETOMIDINE HYDROCHLORIDE 4 MCG: 100 INJECTION, SOLUTION INTRAVENOUS at 09:28

## 2022-12-21 RX ADMIN — DEXAMETHASONE SODIUM PHOSPHATE 4 MG: 10 INJECTION, SOLUTION INTRAMUSCULAR; INTRAVENOUS at 07:37

## 2022-12-21 RX ADMIN — FENTANYL CITRATE 25 MCG: 50 INJECTION INTRAMUSCULAR; INTRAVENOUS at 09:23

## 2022-12-21 RX ADMIN — HYDROMORPHONE HYDROCHLORIDE 0.5 MG: 1 INJECTION, SOLUTION INTRAMUSCULAR; INTRAVENOUS; SUBCUTANEOUS at 10:34

## 2022-12-21 RX ADMIN — OXYCODONE 5 MG: 5 TABLET ORAL at 20:13

## 2022-12-21 RX ADMIN — Medication 100 MCG: at 07:56

## 2022-12-21 RX ADMIN — ALBUTEROL SULFATE 2.5 MG: 2.5 SOLUTION RESPIRATORY (INHALATION) at 18:48

## 2022-12-21 RX ADMIN — ROCURONIUM BROMIDE 5 MG: 50 INJECTION INTRAVENOUS at 07:35

## 2022-12-21 RX ADMIN — BUPIVACAINE HYDROCHLORIDE 60 ML: 2.5 INJECTION, SOLUTION EPIDURAL; INFILTRATION; INTRACAUDAL; PERINEURAL at 07:37

## 2022-12-21 RX ADMIN — EPHEDRINE SULFATE 10 MG: 50 INJECTION INTRAVENOUS at 07:46

## 2022-12-21 RX ADMIN — SUGAMMADEX 400 MG: 100 INJECTION, SOLUTION INTRAVENOUS at 09:24

## 2022-12-21 RX ADMIN — PROPOFOL 250 MG: 10 INJECTION, EMULSION INTRAVENOUS at 07:35

## 2022-12-21 RX ADMIN — ALBUTEROL SULFATE 2.5 MG: 2.5 SOLUTION RESPIRATORY (INHALATION) at 14:46

## 2022-12-21 RX ADMIN — LABETALOL HYDROCHLORIDE 2.5 MG: 5 INJECTION, SOLUTION INTRAVENOUS at 09:20

## 2022-12-21 RX ADMIN — Medication 100 MCG: at 07:38

## 2022-12-21 RX ADMIN — LABETALOL HYDROCHLORIDE 2.5 MG: 5 INJECTION, SOLUTION INTRAVENOUS at 08:35

## 2022-12-21 RX ADMIN — TRAZODONE HYDROCHLORIDE 50 MG: 50 TABLET ORAL at 20:12

## 2022-12-21 RX ADMIN — FAMOTIDINE 20 MG: 10 INJECTION INTRAVENOUS at 07:15

## 2022-12-21 RX ADMIN — EPHEDRINE SULFATE 10 MG: 50 INJECTION INTRAVENOUS at 07:52

## 2022-12-21 RX ADMIN — ESMOLOL HYDROCHLORIDE 30 MG: 10 INJECTION, SOLUTION INTRAVENOUS at 08:05

## 2022-12-21 RX ADMIN — CEFAZOLIN 3 G: 10 INJECTION, POWDER, FOR SOLUTION INTRAVENOUS at 07:39

## 2022-12-21 RX ADMIN — SODIUM CHLORIDE 500 ML: 9 INJECTION, SOLUTION INTRAVENOUS at 07:16

## 2022-12-21 RX ADMIN — TIZANIDINE 4 MG: 4 TABLET ORAL at 12:33

## 2022-12-21 RX ADMIN — Medication 100 MCG: at 07:41

## 2022-12-21 RX ADMIN — LISINOPRIL 40 MG: 40 TABLET ORAL at 12:33

## 2022-12-21 RX ADMIN — BUSPIRONE HYDROCHLORIDE 10 MG: 10 TABLET ORAL at 20:12

## 2022-12-21 RX ADMIN — LIDOCAINE HYDROCHLORIDE 100 MG: 10 INJECTION, SOLUTION EPIDURAL; INFILTRATION; INTRACAUDAL; PERINEURAL at 07:35

## 2022-12-21 RX ADMIN — TIZANIDINE 4 MG: 4 TABLET ORAL at 20:12

## 2022-12-21 RX ADMIN — Medication 200 MCG: at 07:43

## 2022-12-21 RX ADMIN — POTASSIUM CHLORIDE AND SODIUM CHLORIDE 75 ML/HR: 450; 150 INJECTION, SOLUTION INTRAVENOUS at 12:33

## 2022-12-21 RX ADMIN — TRANEXAMIC ACID 1000 MG: 100 INJECTION, SOLUTION INTRAVENOUS at 09:12

## 2022-12-21 RX ADMIN — ROCURONIUM BROMIDE 20 MG: 50 INJECTION INTRAVENOUS at 08:05

## 2022-12-21 RX ADMIN — FENTANYL CITRATE 25 MCG: 50 INJECTION INTRAMUSCULAR; INTRAVENOUS at 08:35

## 2022-12-21 RX ADMIN — LABETALOL HYDROCHLORIDE 5 MG: 5 INJECTION, SOLUTION INTRAVENOUS at 08:10

## 2022-12-21 RX ADMIN — FENTANYL CITRATE 50 MCG: 50 INJECTION INTRAMUSCULAR; INTRAVENOUS at 07:32

## 2022-12-21 RX ADMIN — ROCURONIUM BROMIDE 45 MG: 50 INJECTION INTRAVENOUS at 07:48

## 2022-12-21 RX ADMIN — Medication 100 MCG: at 07:55

## 2022-12-21 NOTE — ANESTHESIA PROCEDURE NOTES
"Subcostal TAP      Patient reassessed immediately prior to procedure    Patient location during procedure: OR  Reason for block: at surgeon's request and post-op pain management  Performed by  CRNA/CAA: López Butcher CRNA  Assisted by: Piyush Barger CRNA  Preanesthetic Checklist  Completed: patient identified, IV checked, site marked, risks and benefits discussed, surgical consent, monitors and equipment checked, pre-op evaluation and timeout performed  Prep:  Pt Position: supine  Sterile barriers:cap, gloves, mask and washed/disinfected hands  Prep: ChloraPrep  Patient monitoring: blood pressure monitoring, continuous pulse oximetry and EKG  Procedure    Sedation: yes  Performed under: general  Guidance:ultrasound guided  Images:still images obtained, printed/placed on chart    Laterality:Bilateral  Block Type:TAP  Injection Technique:single-shot  Needle Type:short-bevel and echogenic  Needle Gauge:20 G  Resistance on Injection: none    Medications Used: dexamethasone sodium phosphate injection - Injection   4 mg - 12/21/2022 7:37:00 AM  bupivacaine PF (MARCAINE) 0.25 % injection - Injection   60 mL - 12/21/2022 7:37:00 AM      Medications  Comment:Block Injection:  LA dose divided between Right and Left block        Post Assessment  Injection Assessment: negative aspiration for heme, incremental injection and no paresthesia on injection  Patient Tolerance:comfortable throughout block  Complications:no  Additional Notes    Subcostal TAPs    A high-frequency linear transducer, with sterile cover, was placed sub-xiphoid to identify [place], right and left Rectus Abdominus Muscles (ANA). The transducer was moved either right or left subcostally to identify the ANA and the Transverse Abdominus Muscle (MEDINA). The insertion site was prepped in sterile fashion and then localized with 2-5 ml of 1% Lidocaine. Using ultrasound-guidance, a 20-gauge B-Barr 4\" Ultraplex 360 non-stimulating echogenic needle was advanced " in plane, from medial to lateral, until the tip of the needle was in the fascial plane between the ANA and MEDINA. 1-3ml of preservative free normal saline was used to hydro-dissect the fascial planes. After the fascial plane was verified, the local anesthetic (LA) was injected. The procedure was repeated on the opposite side for bilateral coverage. Aspiration every 5 ml to prevent intravascular injection. Injection was completed with negative aspiration of blood and negative intravascular injection. Injection pressures were normal with minimal resistance. The subcostal approach to the TAP nerve block ideally anesthetizes the intercostal nerves T6-T9.

## 2022-12-21 NOTE — PROGRESS NOTES
"Cc: NOS  AGBR/chanda  \"feel fine!\"    She is sitting up in bed alone in the room.  Says she feels fine.  Oxygen by nasal cannula.  Tolerating liquid diet.  No pulmonary complaints, spirometer 1000 observed.  No fever or tachycardia.  SPIKE drain serosanguineous, no bile or enteric contents.  Abdomen appropriate, wounds look okay.  I told her she likely will be able to go home tomorrow and she says she better be able to be go home tomorrow and feels fine.  Hospitalist and physical therapy evaluation pending.  Stable postop.  "

## 2022-12-21 NOTE — OP NOTE
Preoperative diagnosis:            Chronic partial slip LapBand (November 2006, Dr. 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                       History of metastatic endometrial adenocarcinoma status post LIZA/BSO, omentectomy and Lap-Band port removal November 2016 ([facility])                               Super morbid obesity (124 kg, BMI 53.51)                                                    Postoperative diagnosis:  Same     Procedure:   Laparoscopic Lap-Band removal               Laparoscopic cholecystectomy                          Surgeon: Sulaiman Carlson MD                                      Anesth:  GETA     EBL:   100 cc     Specimens:  Lap-Band, gallbladder and stones     Drains:    #10 SPIKE     Counts:  Correct     Complications:  None    Findings: No evidence of residual metastatic disease.  Enlarged fatty appearing finely nodular liver.  Extensive bruising without uncontrolled bleeding.  Seroma capsule around the LapBand buckle, no evidence of erosion.  Extensive omental and small bowel adhesions to the anterior abdominal wall.  There are numerous 2 mm cuboidal cholesterol type gallstones.     Indications:  This is a  82-year-old super morbidly obese female known to me status post laparoscopic LapBand placement in 2006.  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  She has had progressive/worsening dysphagia for several months and vomiting and regurgitation on a daily basis and burning throat pain despite PPI therapy, no abdominal pain.    CT scan at  in July shows an ill-defined soft tissue density adjacent to the gastric band along the lesser curvature measuring 3.6 x 2.6 cm and band tubing free in the peritoneal cavity without subcutaneous access port.  It was felt this small collection may represent a complication of the band  or less likely metastatic disease.  Also noted were gallstones.  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  EGD in October showed a chronic partial slip, no erosion appreciated.  Retained food material and liquid noted throughout the esophagus with mild esophageal dilatation and tortuosity.  Biopsies the antrum mid and distal esophagus were unremarkable.  Please see our office notes.  R/b/a rx were discussed and she wishes to proceed with laparoscopic LapBand removal and laparoscopic cholecystectomy.     Operative Technique:   The patient was brought to the operating room and placed supine upon the operating room table, SCD hose were placed, she underwent uneventful general endotracheal anesthesia per the anesthesiology staff,  received IV Ancef and subcutaneous Lovenox and her abdomen was prepped and draped with ChloraPrep in a sterile fashion, an Ioban was used as well.       The peritoneal cavity was entered  in the upper abdomen to the left of midline using an 11 mm fascial splitting trocar utilizing an Optiview technique and the abdomen was insufflated to a pressure of 15 mmHg with CO2 gas.  Exploratory laparoscopy revealed no evidence of injury from the entrance technique,  extensive omental adhesions in the upper abdomen and multiple small bowel adhesions in the mid and lower abdomen some intimately adherent to the anterior abdominal wall.  No visible metastatic disease noted during the procedure.    Under direct visualization 5 mm trochars were placed in the right lateral, left mid, and left lateral abdomen.  Adhesions in the upper abdomen were lysed with the Enseal device exposing the left lobe of the liver and the residual visible Lap-Band tubing.  The liver was mildly enlarged and perhaps finely nodular, photodocumentation obtained.  Additional adhesiolysis was carried out with the Enseal device  along the falciform and left lateral abdomen enough to allow proceeding with the band removal.  There appeared to be edema of the tissues.    The left lobe of the liver was elevated exposing the region of the band.  Adhesions around the proximal tubing were lysed until the end which reached.  There only a few centimeters between the cut end and the beginning of the band tubing change leading to the buckle.   I began cauterizing along the proximal tubing working distally towards the buckle and encountered return of approximately 5 to 10 cc of serosanguineous fluid.  It did not appear enteric, was not bile-stained, no odor appreciated.  Cautery continued until the buckle was exposed.  This was a 4 cc smooth band.  Excessive oozing noted in the area and 1 g TXA IV given and Floseal and Ray-Anjana's used.  Clot formation noted.  No uncontrolled bleeding and blood loss was relatively minor through the procedure.  After cutting the buckle the band itself was cut and both ends easily eviscerated from the tunnel.  No discoloration of the balloon to suggest erosion.  Both pieces were retrieved through the 11 mm trocar site incision to be sent to pathology for permanent section.    There was fairly thick capsule formation and this was debrided medially taking care to avoid a gastrotomy.  This seemed to open things nicely.  Hemostasis was excellent.  Some Floseal and a Ray-Anjana were placed in the area and attention was directed towards cholecystectomy.    The fundus of the gallbladder was grasped and an attempt made to elevated over the dome of the liver however there was no significant room between the diaphragm.  The gallbladder was grasped in approximately the mid body and elevated and in the process there was tear with spillage of multiple small approximately 2-3 mm cuboidal yellow cholesterol appearing stones and brown bile and sludge.  This to the right upper quadrant.  Under direct visualization an additional 5 mm trocar  was placed in the right mid abdomen for retraction of the omentum and transverse colon to allow good visualization of the neck of the gallbladder and region of the cystic duct.  Blunt dissection was used to circumferentially define the junction of the neck of the gallbladder with the cystic duct.  The cystic duct was of small caliber and had no palpable stones.  Dissection in the triangle of Calot revealed the cystic artery entering onto the surface of the gallbladder.  The cystic duct and artery were individually divided between two 5 mm clips on the patient's side 1 on the gallbladder side.  Peritoneal attachments of the gallbladder to the liver bed were divided with cautery.  Prior to dividing final attachments good hemostasis of the gallbladder bed fossa noted.  The gallbladder and all stony material was placed into an Endopouch and withdrawn through the 11 mm trocar site incision.  This did require significant widening of the fascia.  I palpated the gallbladder through the bag, it was packed with a numerous similar stones, no masses appreciated (although exam limited), and it was sent otherwise unopened to pathology for permanent section.  The right upper quadrant was copiously irrigated and suctioned until effluent was clear.  Valsalva maneuvers given and hemostasis excellent.  Remaining Floseal and a Ray-Anjana placed in this area and x-ray was called to confirm no residual foreign body.    When x-ray arrived the Ray-Anjana's were removed and there appeared to be some bile staining of the Ray-Anjana adjacent to the Lap-Band removal site.  I could not see a source.  There did not appear to be bile draining from the undersurface the left lobe of the liver or from the lesser curvature.  The gallbladder bed fossa and area of band removal  remained hemostatic.  Therefore my plan was to place a SPIKE drain.  Intraoperative x-rays were then obtained confirming the absence of any residual Lap-Band foreign body, confirmed by the  radiologist Dr. Vidales.    A #10 flat fully perforated SPIKE drain was placed under the left lobe of the liver along the lesser curvature where the band had been removed and into Morison's pouch at the base of the gallbladder bed fossa and brought out through the right sided 5 mm trocar site incision and anchored to the skin with a 2-0 nylon suture.     Fascia at the 11 mm trocar site incision was closed with a horizontal mattress 0 Vicryl suture placed with a suture passer under direct visualization and tying the knot extracorporeally.  This was further reinforced with an additional 0 Vicryl suture placed in a similar fashion.    Remaining trochars were removed under direct visualization, no bleeding noted from their sites.    She was noted to have very thin attenuated skin and relatively small amount of subcutaneous fat.    Subcutaneous tissue in the 11 mm trocar site incision was closed with a figure-of-eight 2-0 Vicryl plus suture to take off the tension and skin each incision was closed using 3-0 Monocryl plus in an interrupted subcuticular stitch followed by skin glue.  A dry sterile dressing was placed around the SPIKE site.     The patient tolerated the procedure well without complication and was taken to the recovery room in stable condition.

## 2022-12-21 NOTE — ANESTHESIA PROCEDURE NOTES
Airway  Urgency: elective    Date/Time: 12/21/2022 7:35 AM  Airway not difficult    General Information and Staff    Patient location during procedure: OR  CRNA/CAA: Piyush Barger, CRNA    Indications and Patient Condition  Indications for airway management: airway protection    Preoxygenated: yes  MILS not maintained throughout  Mask difficulty assessment: 0 - not attempted    Final Airway Details  Final airway type: endotracheal airway      Successful airway: ETT  Cuffed: yes   Successful intubation technique: direct laryngoscopy and RSI  Facilitating devices/methods: intubating stylet and cricoid pressure  Endotracheal tube insertion site: oral  Blade: Bebeto  Blade size: 3  ETT size (mm): 7.0  Cormack-Lehane Classification: grade III - view of epiglottis only  Placement verified by: chest auscultation and capnometry   Cuff volume (mL): 5  Measured from: lips  ETT/EBT  to lips (cm): 20  Number of attempts at approach: 1  Assessment: lips, teeth, and gum same as pre-op and atraumatic intubation    Additional Comments  Negative epigastric sounds, Breath sound equal bilaterally with symmetric chest rise and fall

## 2022-12-21 NOTE — PLAN OF CARE
Goal Outcome Evaluation:  Plan of Care Reviewed With: patient        Progress: improving  Outcome Evaluation: Pt admit to floor from PACU. VSS. 2L NC O2 to room air. NSR on the monitor. Pt complaints of pain. PRN's given for pain control. No complaints of nausea. Pt ambulated in room to bathroom with x1 assist and voided. Fluids infusing. Lap sites clean and dry with no drainage. SPIKE drain in place with serosangineous output. Pt resting comfortably and utilizing ICS and chewing gum with encouragement. Ambulation encouraged. No further complaints at this time.

## 2022-12-21 NOTE — ANESTHESIA PREPROCEDURE EVALUATION
Anesthesia Evaluation     Patient summary reviewed and Nursing notes reviewed   no history of anesthetic complications:  NPO Solid Status: > 8 hours  NPO Liquid Status: > 8 hours           Airway   Mallampati: II  TM distance: >3 FB  Neck ROM: full  No difficulty expected  Dental      Pulmonary - normal exam   (+) asthma,  Cardiovascular - normal exam    (+) hypertension, valvular problems/murmurs, hyperlipidemia,       Neuro/Psych  (+) headaches, psychiatric history,    GI/Hepatic/Renal/Endo    (+) morbid obesity, GERD,  thyroid problem hypothyroidism    Musculoskeletal     (+) back pain,   Abdominal    Substance History      OB/GYN          Other   arthritis,    history of cancer                    Anesthesia Plan    ASA 3     general     intravenous induction     Anesthetic plan, risks, benefits, and alternatives have been provided, discussed and informed consent has been obtained with: patient.    Plan discussed with CRNA.        CODE STATUS:

## 2022-12-21 NOTE — CONSULTS
[facility] Medicine Services  CONSULT NOTE      Patient Name: Desirae Menchaca  : 1940  MRN: [id number]    Primary Care Physician: Eileen Beckwith MD  Provider requesting consultation: Sulaiman Carlson MD    Subjective   Subjective     Reason for Consultation:  Consultation for fibromyalgia, thyroid and hypertension    HPI:  Desirae Menchaca is a 82 y.o. female with PMH significant for anxiety, arthritis, asthma, HLD, HTN, GERD, hx endometrial cancer who presented to [facility] as a direct admission to Dr. Carlson for bariatric and gallbladder surgery on 2022.  Patient is interested in walking and plans on being discharged tomorrow.  Hospital medicine was consulted to medically manage patient.    Review of Systems  Gen- No fevers, chills  CV- No chest pain, palpitations  Resp- No cough, dyspnea  GI- No N/V/D, +appropriate surgical abd pain  All other systems have been reviewed and the pertinent positives and negatives are listed above in the HPI or ROS  Personal History     Past Medical History:   Diagnosis Date   • Anxiety    • Arthritis    • Asthma    • Back pain    • Constipation    • Dermatitis     sees derm   • Endometrial cancer (HCC) 2016    s/p TAHBSO + chemo, follows @[facility]- was in port of lapband per pt   • Fibromyalgia    • GERD (gastroesophageal reflux disease)     on PPI   • Headache    • History of rheumatic fever    • Hyperlipidemia    • Hypertension    • Impaired mobility    • Morbid obesity (HCC)    • Murmur     just while pregnant   • Thyroid nodule     U/S, eval pending   • Vomiting     with lap band blockage   • Wears glasses        Past Surgical History:   Procedure Laterality Date   • ANKLE SURGERY Left     plate and 5 screws   • BREAST BIOPSY Right    • CATARACT EXTRACTION, BILATERAL Bilateral    • COLONOSCOPY     • DILATATION AND CURETTAGE      x2   • ENDOSCOPY N/A 10/26/2022    Procedure: ESOPHAGOGASTRODUODENOSCOPY WITH BIOPSY;  Surgeon: Aldo  Sulaiman Gross MD;  Location: [facility] ENDOSCOPY;  Service: General;  Laterality: N/A;   • LAPAROSCOPIC GASTRIC BANDING  2006    w/ Dr. Carlson   • TONSILLECTOMY     • TOTAL ABDOMINAL HYSTERECTOMY  2016    w/ omentectomy + AGB port removal @ - probable endometrioid adenocarcinoma   • TOTAL SHOULDER ARTHROPLASTY Bilateral    • VENOUS ACCESS DEVICE (PORT) REMOVAL         Family History: family history includes Early death in her father; Heart disease in her father; Hypertension in her father. Otherwise pertinent FHx was reviewed and unremarkable.     Social History:  reports that she has never smoked. She has never used smokeless tobacco. She reports current alcohol use of about 10.0 standard drinks per week. She reports that she does not use drugs.    Medications:  Ammonium Lactate, DULoxetine, Diclofenac Sodium, benazepril, betamethasone (augmented), busPIRone, clobetasol, furosemide, mupirocin, omeprazole, tiZANidine, traZODone, triamterene-hydrochlorothiazide, and zolpidem    Scheduled Meds:Albuterol Sulfate NEB Orderable, 2.5 mg, Nebulization, Q6H - RT  busPIRone, 10 mg, Oral, Q12H  ceFAZolin, 3 g, Intravenous, Q8H  [START ON 12/22/2022] DULoxetine, 120 mg, Oral, Daily  [START ON 12/22/2022] enoxaparin, 40 mg, Subcutaneous, Q12H  lisinopril, 40 mg, Oral, Q24H  [START ON 12/22/2022] pantoprazole, 40 mg, Oral, QAM  tiZANidine, 4 mg, Oral, BID  triamterene-hydrochlorothiazide, 1 tablet, Oral, Daily      Continuous Infusions:sodium chloride 0.45 % with KCl 20 mEq, 75 mL/hr, Last Rate: 75 mL/hr (12/21/22 1233)      PRN Meds:.•  acetaminophen  •  ALPRAZolam  •  diphenhydrAMINE  •  [START ON 12/22/2022] ferric gluconate  •  furosemide  •  HYDROmorphone **AND** naloxone  •  HYDROmorphone  •  LORazepam  •  LORazepam  •  Morphine **AND** naloxone  •  ondansetron **FOLLOWED BY** [START ON 12/25/2022] ondansetron  •  oxyCODONE  •  phenol  •  prochlorperazine  •  promethazine  •  simethicone  •  traZODone  •   zolpidem    Allergies   Allergen Reactions   • Dexamethasone Swelling   • Ciprofloxacin Unknown - Low Severity   • Risperidone Unknown - Low Severity       Objective   Objective     Vital Signs:   Temp:  [97.3 °F (36.3 °C)-98.7 °F (37.1 °C)] 98.4 °F (36.9 °C)  Heart Rate:  [66-86] 75  Resp:  [14-20] 20  BP: (120-165)/(53-81) 163/81  Flow (L/min):  [2-6] 2    Physical Exam  Constitutional: Alert, morbidly obese female sitting up in bed in NAD  Eyes: EOMI, sclerae anicteric, no conjunctival injection  Head: NCAT  ENT: [place], moist mucous membranes   Respiratory: Nonlabored, symmetrical chest expansion, CTAB, 95% 2L  Cardiovascular: RRR, HR 77, no M/R/G, +DP pulses bilaterally  Gastrointestinal: Morbidly obese got up again, soft, NT, ND +BS  Musculoskeletal: AMBROSE; no LE edema bilaterally  Neurologic: Oriented x4, strength symmetric in all extremities, follows all commands, speech clear  Skin: No rashes on exposed skin  Psychiatric: Pleasant and cooperative; normal affect    Result Review:  I have personally reviewed the results from the time of admission and agree with these findings:  []  Laboratory  []  Radiology  []  EKG/Telemetry   []  Pathology  []  Old records  []  Other:  Most notable findings include:     LAB RESULTS:      Lab 12/16/22  1103   WBC 8.30   HEMOGLOBIN 13.5   HEMATOCRIT 41.7   PLATELETS 339   MCV 89.5         Lab 12/16/22  1103   POTASSIUM 4.6   HEMOGLOBIN A1C 5.60                         Brief Urine Lab Results     None        Microbiology Results (last 10 days)     Procedure Component Value - Date/Time    MRSA Screen Culture (Outpatient) - Swab, Nares [[id number]]  (Normal) Collected: 12/16/22 1103    Lab Status: Final result Specimen: Swab from Nares Updated: 12/17/22 1435     MRSA Screen Cx No Methicillin Resistant Staphylococcus aureus isolated    Narrative:      The negative predictive value of this diagnostic test is high and should only be used to consider de-escalating anti-MRSA therapy. A  positive result may indicate colonization with MRSA and must be correlated clinically.  The negative predictive value of this diagnostic test is high and should only be used to consider de-escalating anti-MRSA therapy. A positive result may indicate colonization with MRSA and must be correlated clinically.          Subcostal TAP    Result Date: 12/21/2022  Piyush Barger CRNA     12/21/2022  7:50 AM Subcostal TAP Patient reassessed immediately prior to procedure Patient location during procedure: OR Reason for block: at surgeon's request and post-op pain management Performed by CRNA/CAA: López Butcher CRNA Assisted by: Piyush Barger CRNA Preanesthetic Checklist Completed: patient identified, IV checked, site marked, risks and benefits discussed, surgical consent, monitors and equipment checked, pre-op evaluation and timeout performed Prep: Pt Position: supine Sterile barriers:cap, gloves, mask and washed/disinfected hands Prep: ChloraPrep Patient monitoring: blood pressure monitoring, continuous pulse oximetry and EKG Procedure Sedation: yes Performed under: general Guidance:ultrasound guided Images:still images obtained, printed/placed on chart Laterality:Bilateral Block Type:TAP Injection Technique:single-shot Needle Type:short-bevel and echogenic Needle Gauge:20 G Resistance on Injection: none Medications Used: dexamethasone sodium phosphate injection - Injection  4 mg - 12/21/2022 7:37:00 AM bupivacaine PF (MARCAINE) 0.25 % injection - Injection  60 mL - 12/21/2022 7:37:00 AM Medications Comment:Block Injection:  LA dose divided between Right and Left block Post Assessment Injection Assessment: negative aspiration for heme, incremental injection and no paresthesia on injection Patient Tolerance:comfortable throughout block Complications:no Additional Notes Subcostal TAPs A high-frequency linear transducer, with sterile cover, was placed sub-xiphoid to identify [place], right and left Rectus Abdominus  "Muscles (ANA). The transducer was moved either right or left subcostally to identify the ANA and the Transverse Abdominus Muscle (MEDINA). The insertion site was prepped in sterile fashion and then localized with 2-5 ml of 1% Lidocaine. Using ultrasound-guidance, a 20-gauge B-Barr 4\" Ultraplex 360 non-stimulating echogenic needle was advanced in plane, from medial to lateral, until the tip of the needle was in the fascial plane between the ANA and MEDINA. 1-3ml of preservative free normal saline was used to hydro-dissect the fascial planes. After the fascial plane was verified, the local anesthetic (LA) was injected. The procedure was repeated on the opposite side for bilateral coverage. Aspiration every 5 ml to prevent intravascular injection. Injection was completed with negative aspiration of blood and negative intravascular injection. Injection pressures were normal with minimal resistance. The subcostal approach to the TAP nerve block ideally anesthetizes the intercostal nerves T6-T9.      XR Lost Needle / Instrument    Result Date: 12/21/2022  DATE OF EXAM: 12/21/2022 8:46 AM  PROCEDURE: XR LOST NEEDLE/INSTRUMENT-  INDICATIONS: GASTRIC BANDING REMOVAL LAPAROSCOPIC; K80.20-Calculus of gallbladder without cholecystitis without obstruction; K95.09-Other complications of gastric band procedure  COMPARISON: No comparisons available.  TECHNIQUE: 3 intraoperative radiographs of the supine abdomen status post removal of lap band device per protocol  FINDINGS:  Hemostat/clamps projects over the epigastrium. Laparoscopic ports project over the lower abdomen. Right upper quadrant cholecystectomy clips are noted. The previously seen lap band device and associated tubing is no longer visualized compatible with complete removal.      Impression: Hemostat/clamps projects over the epigastrium. Laparoscopic ports project over the lower abdomen. Right upper quadrant cholecystectomy clips are noted. The previously seen lap band " device and associated tubing is no longer visualized compatible with complete removal.  Above findings were discussed with OR x-ray tech by Dr. Saleem Vidales in person at 9:15 AM 12/21/2022.  This report was finalized on 12/21/2022 9:35 AM by Saleem Vidales MD.        Assessment & Plan   Assessment & Plan     Active Hospital Problems    Diagnosis  POA   • Calculus of gallbladder without cholecystitis without obstruction [K80.20]  Yes   • Hypertension [I10]  Yes   • GERD (gastroesophageal reflux disease) [K21.9]  Yes   • Morbid obesity (HCC) [E66.01]  Yes      Resolved Hospital Problems    Diagnosis Date Resolved POA   • **Calculus of gallbladder without cholecystitis without obstruction [K80.20] 12/21/2022 Yes   • Complication of gastric banding [K95.09] 12/21/2022 Yes       S/p laparoscopic Lap-Band and cholecystectomy on 12/21/2022  -- Followed by Dr. Carlson  -- Pain control per Dr. Carlson  --Ambulate as much as possible  --Follow bariatric diet  --AM labs    HTN  --Continue home medications  -- Mildly elevated most likely due to pain  -- Patient does not want her medications readjusted or any added    GERD  Morbid obesity  --Complicates all aspects of care      Thank you for allowing [facility] [place] Medicine Service to provide consultative care for your patient, we will continue to follow while clinically appropriate.    MIKA Randolph  12/21/22

## 2022-12-21 NOTE — ANESTHESIA POSTPROCEDURE EVALUATION
Patient: Desirae Menchaca    Procedure Summary     Date: 12/21/22 [facility] / [facility]:  [facility]    Anesthesia Start: 0729 Anesthesia Stop: 0946    Procedures:       GASTRIC BANDING REMOVAL LAPAROSCOPIC (Abdomen)      CHOLECYSTECTOMY LAPAROSCOPIC w/ possible INTRAOPERATIVE CHOLANGIOGRAM (Abdomen) Diagnosis:       Gastric band slippage      Calculus of gallbladder without cholecystitis without obstruction      (Gastric band slippage [K95.09])      (Calculus of gallbladder without cholecystitis without obstruction [K80.20])    Surgeons: Sulaiman Carlson MD Provider: Kumar Avilez MD    Anesthesia Type: general ASA Status: 3          Anesthesia Type: general    Vitals  Vitals Value Taken Time   BP     Temp     Pulse 72 12/21/22 0944   Resp     SpO2 91 % 12/21/22 0944   Vitals shown include unvalidated device data.        Post Anesthesia Care and Evaluation    Patient location during evaluation: PACU  Patient participation: complete - patient participated  Level of consciousness: awake  Pain management: adequate    Airway patency: patent  Anesthetic complications: No anesthetic complications  PONV Status: none  Cardiovascular status: hemodynamically stable and acceptable  Respiratory status: nonlabored ventilation, acceptable and nasal cannula  Hydration status: acceptable

## 2022-12-21 NOTE — BRIEF OP NOTE
GASTRIC BANDING REMOVAL LAPAROSCOPIC  Progress Note    Desirae Menchaca  12/21/2022    Pre-op Diagnosis:   Gastric band slippage [K95.09]  Calculus of gallbladder without cholecystitis without obstruction [K80.20]       Post-Op Diagnosis Codes:     * Gastric band slippage [K95.09]     * Calculus of gallbladder without cholecystitis without obstruction [K80.20]    Procedure/CPT® Codes:  NV LAP, REMOVE ADJUST OLIVER RESTRICT DEVICE/PORT [89524]  NV LAP,CHOLECYSTECTOMY [19042]      Procedure(s):  GASTRIC BANDING REMOVAL LAPAROSCOPIC  CHOLECYSTECTOMY LAPAROSCOPIC w/ possible INTRAOPERATIVE CHOLANGIOGRAM        Surgeon(s):  Sulaiman Carlson MD Weiss, George Derek, MD    Anesthesia: General with Block    Staff:   Circulator: Coral Singh RN; Camille Hayes RN  Scrub Person: Naomy Macias  Nursing Assistant: Darinel Torres PCT; Andie Singleton         Estimated Blood Loss: 100ml    Urine Voided: * No values recorded between 12/21/2022  7:28 AM and 12/21/2022  9:25 AM *    Specimens:                Specimens     ID Source Type Tests Collected By Collected At Frozen?    A Gallbladder Tissue · TISSUE PATHOLOGY EXAM   Sulaiman Carlson MD 12/21/22 0832     B Abdominal Wall Tissue · TISSUE PATHOLOGY EXAM   Sulaiman Carlson MD 12/21/22 0832     Description: EXPLANTED PORT    Comment: Band only; port removed in previous procedure                 Drains:   Closed/Suction Drain 1 RLQ (Active)       Findings:         Complications: none          Sulaiman Carlson MD     Date: 12/21/2022  Time: 09:33 EST

## 2022-12-22 VITALS
TEMPERATURE: 97.8 F | SYSTOLIC BLOOD PRESSURE: 104 MMHG | HEART RATE: 88 BPM | RESPIRATION RATE: 18 BRPM | WEIGHT: 274 LBS | DIASTOLIC BLOOD PRESSURE: 57 MMHG | HEIGHT: 60 IN | BODY MASS INDEX: 53.79 KG/M2 | OXYGEN SATURATION: 94 %

## 2022-12-22 PROBLEM — K80.20 CALCULUS OF GALLBLADDER WITHOUT CHOLECYSTITIS WITHOUT OBSTRUCTION: Status: RESOLVED | Noted: 2022-12-21 | Resolved: 2022-12-22

## 2022-12-22 LAB
ALBUMIN SERPL-MCNC: 3.7 G/DL (ref 3.5–5.2)
ALBUMIN/GLOB SERPL: 1.5 G/DL
ALP SERPL-CCNC: 73 U/L (ref 39–117)
ALT SERPL W P-5'-P-CCNC: 28 U/L (ref 1–33)
ANION GAP SERPL CALCULATED.3IONS-SCNC: 11 MMOL/L (ref 5–15)
AST SERPL-CCNC: 33 U/L (ref 1–32)
BASOPHILS # BLD AUTO: 0.03 10*3/MM3 (ref 0–0.2)
BASOPHILS NFR BLD AUTO: 0.2 % (ref 0–1.5)
BILIRUB SERPL-MCNC: 0.4 MG/DL (ref 0–1.2)
BUN SERPL-MCNC: 32 MG/DL (ref 8–23)
BUN/CREAT SERPL: 39 (ref 7–25)
CALCIUM SPEC-SCNC: 9.2 MG/DL (ref 8.6–10.5)
CHLORIDE SERPL-SCNC: 101 MMOL/L (ref 98–107)
CO2 SERPL-SCNC: 24 MMOL/L (ref 22–29)
CREAT SERPL-MCNC: 0.82 MG/DL (ref 0.57–1)
CYTO UR: NORMAL
DEPRECATED RDW RBC AUTO: 44.9 FL (ref 37–54)
EGFRCR SERPLBLD CKD-EPI 2021: 71.5 ML/MIN/1.73
EOSINOPHIL # BLD AUTO: 0.01 10*3/MM3 (ref 0–0.4)
EOSINOPHIL NFR BLD AUTO: 0.1 % (ref 0.3–6.2)
ERYTHROCYTE [DISTWIDTH] IN BLOOD BY AUTOMATED COUNT: 13.6 % (ref 12.3–15.4)
GLOBULIN UR ELPH-MCNC: 2.5 GM/DL
GLUCOSE SERPL-MCNC: 110 MG/DL (ref 65–99)
HCT VFR BLD AUTO: 34.9 % (ref 34–46.6)
HGB BLD-MCNC: 11.2 G/DL (ref 12–15.9)
IMM GRANULOCYTES # BLD AUTO: 0.09 10*3/MM3 (ref 0–0.05)
IMM GRANULOCYTES NFR BLD AUTO: 0.6 % (ref 0–0.5)
IRON 24H UR-MRATE: 25 MCG/DL (ref 37–145)
LAB AP CASE REPORT: NORMAL
LAB AP CLINICAL INFORMATION: NORMAL
LYMPHOCYTES # BLD AUTO: 1.56 10*3/MM3 (ref 0.7–3.1)
LYMPHOCYTES NFR BLD AUTO: 10 % (ref 19.6–45.3)
MCH RBC QN AUTO: 29 PG (ref 26.6–33)
MCHC RBC AUTO-ENTMCNC: 32.1 G/DL (ref 31.5–35.7)
MCV RBC AUTO: 90.4 FL (ref 79–97)
MONOCYTES # BLD AUTO: 0.89 10*3/MM3 (ref 0.1–0.9)
MONOCYTES NFR BLD AUTO: 5.7 % (ref 5–12)
NEUTROPHILS NFR BLD AUTO: 13.09 10*3/MM3 (ref 1.7–7)
NEUTROPHILS NFR BLD AUTO: 83.4 % (ref 42.7–76)
NRBC BLD AUTO-RTO: 0 /100 WBC (ref 0–0.2)
PATH REPORT.FINAL DX SPEC: NORMAL
PATH REPORT.GROSS SPEC: NORMAL
PLATELET # BLD AUTO: 268 10*3/MM3 (ref 140–450)
PMV BLD AUTO: 10.2 FL (ref 6–12)
POTASSIUM SERPL-SCNC: 4.8 MMOL/L (ref 3.5–5.2)
PREALB SERPL-MCNC: 18.6 MG/DL (ref 20–40)
PROT SERPL-MCNC: 6.2 G/DL (ref 6–8.5)
RBC # BLD AUTO: 3.86 10*6/MM3 (ref 3.77–5.28)
SODIUM SERPL-SCNC: 136 MMOL/L (ref 136–145)
WBC NRBC COR # BLD: 15.67 10*3/MM3 (ref 3.4–10.8)

## 2022-12-22 PROCEDURE — 99024 POSTOP FOLLOW-UP VISIT: CPT | Performed by: SURGERY

## 2022-12-22 PROCEDURE — 85025 COMPLETE CBC W/AUTO DIFF WBC: CPT | Performed by: SURGERY

## 2022-12-22 PROCEDURE — 94799 UNLISTED PULMONARY SVC/PX: CPT

## 2022-12-22 PROCEDURE — 25010000002 ENOXAPARIN PER 10 MG: Performed by: SURGERY

## 2022-12-22 PROCEDURE — 25010000002 NA FERRIC GLUC CPLX PER 12.5 MG: Performed by: SURGERY

## 2022-12-22 PROCEDURE — 84134 ASSAY OF PREALBUMIN: CPT | Performed by: SURGERY

## 2022-12-22 PROCEDURE — G0378 HOSPITAL OBSERVATION PER HR: HCPCS

## 2022-12-22 PROCEDURE — 94664 DEMO&/EVAL PT USE INHALER: CPT

## 2022-12-22 PROCEDURE — 94761 N-INVAS EAR/PLS OXIMETRY MLT: CPT

## 2022-12-22 PROCEDURE — 80053 COMPREHEN METABOLIC PANEL: CPT | Performed by: SURGERY

## 2022-12-22 PROCEDURE — 83540 ASSAY OF IRON: CPT | Performed by: SURGERY

## 2022-12-22 PROCEDURE — 97161 PT EVAL LOW COMPLEX 20 MIN: CPT

## 2022-12-22 PROCEDURE — 97110 THERAPEUTIC EXERCISES: CPT

## 2022-12-22 PROCEDURE — 99212 OFFICE O/P EST SF 10 MIN: CPT | Performed by: FAMILY MEDICINE

## 2022-12-22 RX ORDER — TRIAMTERENE AND HYDROCHLOROTHIAZIDE 37.5; 25 MG/1; MG/1
1 TABLET ORAL DAILY PRN
Status: DISCONTINUED | OUTPATIENT
Start: 2022-12-22 | End: 2022-12-22 | Stop reason: HOSPADM

## 2022-12-22 RX ORDER — OXYCODONE HYDROCHLORIDE 5 MG/1
5 TABLET ORAL EVERY 4 HOURS PRN
Qty: 10 TABLET | Refills: 0 | Status: SHIPPED | OUTPATIENT
Start: 2022-12-22 | End: 2023-01-04

## 2022-12-22 RX ADMIN — OXYCODONE 5 MG: 5 TABLET ORAL at 03:01

## 2022-12-22 RX ADMIN — PANTOPRAZOLE SODIUM 40 MG: 40 TABLET, DELAYED RELEASE ORAL at 06:12

## 2022-12-22 RX ADMIN — ENOXAPARIN SODIUM 40 MG: 40 INJECTION SUBCUTANEOUS at 10:10

## 2022-12-22 RX ADMIN — ALBUTEROL SULFATE 2.5 MG: 2.5 SOLUTION RESPIRATORY (INHALATION) at 07:52

## 2022-12-22 RX ADMIN — DULOXETINE HYDROCHLORIDE 120 MG: 60 CAPSULE, DELAYED RELEASE ORAL at 08:34

## 2022-12-22 RX ADMIN — LISINOPRIL 40 MG: 40 TABLET ORAL at 08:32

## 2022-12-22 RX ADMIN — SODIUM CHLORIDE 125 MG: 9 INJECTION, SOLUTION INTRAVENOUS at 11:43

## 2022-12-22 RX ADMIN — BUSPIRONE HYDROCHLORIDE 10 MG: 10 TABLET ORAL at 08:34

## 2022-12-22 RX ADMIN — TIZANIDINE 4 MG: 4 TABLET ORAL at 08:33

## 2022-12-22 NOTE — PLAN OF CARE
Goal Outcome Evaluation:  Plan of Care Reviewed With: patient, daughter        Progress: no change  Outcome Evaluation: PT eval completed. Pt ambulated 100 feet with FWx with CGA with decreased gait speed and slight forward flexed posture. Pt demonstrated decreased functional endurance and strength deficits at this time warranting IPPT. d/c rec home with assist, HH PT/OT, a BSC, and ambulating with walker at home for safety.

## 2022-12-22 NOTE — NURSING NOTE
"IV leaking at site, pt informed she needed new IV, pt stated no she would not be getting a new IV because she was going home in AM, educated pt that she still needed IV fluids, pt using a rude tone stated \"I dont care, you can go and drink them\".     20G IV in LT wrist removed, tip intact. Clean dry dressing applied.   "

## 2022-12-22 NOTE — PROGRESS NOTES
"Cc: POD#1 AGBR/chanda  \"feel great!\"    Her granddaughter is in the room.  The patient is sitting up in a chair at the side of the bed, looks and feels well and wants to go home.  Wearing oxygen by nasal cannula.  Says she can already tell her dysphagia is better when she takes her pills.  Eager to get regular food.  Denies nausea and says she does not need any antiemetics for discharge.    No fever or tachycardia pulse 81 blood pressure 115/73 respiration 16 saturation 96%   -300  SPIKE 20 - NR SS she is no apparent distress.  Abdomen soft and appropriate.  Wounds look okay.  SPIKE dressing dry.    CMP normal except for glucose of 110 BUN of 32 AST of 33.  Iron is 25.  Prealbumin pending.  White count 15.7 with 83 segs 10 lymphs 6 monocytes no bands H&H 11 and 35 hemoglobin A1c 5.60    Impression: Postop day #1 laparoscopic Lap-Band removal for slip and cholecystectomy for chronic cholecystitis with cholelithiasis.  Iron deficiency anemia without evidence of bleeding on Lovenox.  Leukocytosis with mild left shift, no evidence of infection.  Poor incentive spirometry and requiring oxygen by nasal cannula, asymptomatic.  Elevated AST but no evidence of complication status post cholecystectomy    Plan: Discharge home.  Appreciate hospitalist assistance, they can evaluate whether patient will need home oxygen.  Given her BMI and body habitus likely undiagnosed obstructive sleep apnea.  JAYY LALA drain.  Risks, benefits, alternative therapies were discussed and she is agreeable to taking Eliquis 2.5 mg p.o. every 12 hours for the next 3 weeks to help minimize her increased VTE risk, it sounds like she took Lovenox at home injections after her GYN surgery.  Advance diet slowly as tolerated, warned her she may have dysphagia for a few days even with the band removed.  Call with any problems questions or concerns.  See orders      "

## 2022-12-22 NOTE — CASE MANAGEMENT/SOCIAL WORK
Case Management Discharge Note      Final Note: Spoke with patient and daughter at bedside - patient aware she will be going home today. PT has recommended a bedside commode for home and patient agreeable- order placed and called to Juvenal with [employer] who will deliver to bedside. Patient also agreeable to home health PT and OT. Order has been placed and called to Kaykay with [facility] - they have accepted and will follow. Family to transport patient home - no other dc needs identified -primo [phone]         Selected Continued Care - Admitted Since 12/21/2022     Destination    No services have been selected for the patient.              Durable Medical Equipment     Service Provider Selected Services Address Phone Fax Patient Preferred    [facility] [address], [place] [id number] [phone] --          Dialysis/Infusion    No services have been selected for the patient.              Home Medical Care     Service Provider Selected Services Address Phone Fax Patient Preferred    [facility] [address], [facility] [id number] [phone] --          Therapy    No services have been selected for the patient.              Community Resources    No services have been selected for the patient.              Community & DME    No services have been selected for the patient.                       Final Discharge Disposition Code: 06 - home with home health care

## 2022-12-22 NOTE — PLAN OF CARE
Goal Outcome Evaluation:  Plan of Care Reviewed With: patient           Outcome Evaluation: VSS on room air-2L nasal cannula, prn meds given for c/o pain, pt refusing new IV placement. Pt hopeful to d/c today.

## 2022-12-22 NOTE — PROGRESS NOTES
Clinical Nutrition     Patient Name: Desirae Menchaca  YOB: 1940  MRN: [id number]  Date of Encounter: 22 10:47 EST  Admission date: 2022    Reason for Visit   dysphagia    EMR reviewed    Yes    Diet Nutrition Related History     Patient had her lap band removed and reports her dysphagia is already better and is excited to eat regular food. Patient is drinking her Premier Protein and says she'll continue to drink it when she gets home. Patient denies any N/V/D and her last BM was on 22. Patient states she is scheduled to d/c this morning, 22.    Current Nutrition Prescription    Diet: Texture: Regular Texture (IDDSI 7); Fluid Consistency: Thin (IDDSI 0); Regular/House Diet; Bariatric Stage 1  Orders Placed This Encounter      Snack: Chewing Gum Three Times Daily x30 Minutes to Increase Saliva Flow and Provide Gas Pain Relief      DIET MESSAGE Please send patient tray. Thanks    Average Intake from [place]% x 3 meals    Actions:    Follow treatment progress, Care plan reviewed, Interview for preferences    Monitor Per Protocol    Jocelyn White,   Time Spent: 20 minutes

## 2022-12-22 NOTE — PLAN OF CARE
Goal Outcome Evaluation:  Plan of Care Reviewed With: patient        Progress: improving  Outcome Evaluation: VSS. 2L NC O2. NSR on the monitor. No complaints of pain. Lap sites clean and dry with no drainage. SPIKE drain in place with serosangineous drainage. Pt ambulating, using ICS, and chewing gum with encouragement. Pt worked with therapy. Discharge orders placed. Pt must get IV Iron infusion before discharge. Family at bedside. Pt resting comfortably in chair. No further complaints at this time.

## 2022-12-22 NOTE — THERAPY EVALUATION
Patient Name: Desirae Menchaca  : 1940    MRN: [id number]                              Today's Date: 2022       Admit Date: 2022    Visit Dx:     ICD-10-CM ICD-9-CM   1. Calculus of gallbladder without cholecystitis without obstruction  K80.20 574.20   2. Gastric band slippage  K95.09 996.59     Patient Active Problem List   Diagnosis   • Morbid obesity (HCC)   • Hypertension   • GERD (gastroesophageal reflux disease)   • Impaired mobility   • Endometrial cancer (HCC)   • Anxiety   • Thyroid nodule   • Fibromyalgia     Past Medical History:   Diagnosis Date   • Anxiety    • Arthritis    • Asthma    • Back pain    • Constipation    • Dermatitis     sees derm   • Endometrial cancer (HCC)     s/p TAHBSO + chemo, follows @- was in port of lapband per pt   • Fibromyalgia    • GERD (gastroesophageal reflux disease)     on PPI   • Headache    • History of rheumatic fever    • Hyperlipidemia    • Hypertension    • Impaired mobility    • Morbid obesity (HCC)    • Murmur     just while pregnant   • Thyroid nodule     U/S, eval pending   • Vomiting     with lap band blockage   • Wears glasses      Past Surgical History:   Procedure Laterality Date   • ANKLE SURGERY Left 1999    plate and 5 screws   • BREAST BIOPSY Right    • CATARACT EXTRACTION, BILATERAL Bilateral    • CHOLECYSTECTOMY N/A 2022    Procedure: CHOLECYSTECTOMY LAPAROSCOPIC;  Surgeon: Sulaiman Carlson MD;  Location: [facility] OR;  Service: General;  Laterality: N/A;   • COLONOSCOPY     • DILATATION AND CURETTAGE      x2   • ENDOSCOPY N/A 10/26/2022    Procedure: ESOPHAGOGASTRODUODENOSCOPY WITH BIOPSY;  Surgeon: Sulaiman Carlson MD;  Location: [facility] ENDOSCOPY;  Service: General;  Laterality: N/A;   • GASTRIC BANDING REMOVAL N/A 2022    Procedure: GASTRIC BANDING REMOVAL LAPAROSCOPIC;  Surgeon: Sulaiman Carlson MD;  Location:  [facility];  Service: Bariatric;  Laterality: N/A;   • LAPAROSCOPIC GASTRIC BANDING       w/ Dr. 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 requires assist for dressing  -     Existing Precautions/Restrictions fall;oxygen therapy device and L/min  ab incision  -     Barriers to Rehab medically complex  -     Row Name 12/22/22 1045          Living Environment    People in Home spouse  -     Row Name 12/22/22 1045          Home Main Entrance    Number of Stairs, Main Entrance other (see comments)  ramp  -     Row Name 12/22/22 1045          Stairs Within Home, Primary    Number of Stairs, Within Home, Primary other (see comments)  has staircase but utilizes a chair lift at baseline  -     Row Name 12/22/22 1045          Cognition    Orientation Status (Cognition) oriented x 4  -HM     Row Name 12/22/22 1045          Safety Issues, Functional Mobility    Safety Issues Affecting Function (Mobility) insight into deficits/self-awareness;safety precaution awareness  -     Impairments Affecting Function (Mobility) balance;endurance/activity tolerance;strength  -           User Key  (r) = Recorded By, (t) = Taken By, (c) = Cosigned By    Initials Name Provider Type     Mandy Soria PT Physical Therapist               Mobility     Row Name 12/22/22 1046          Sit-Stand Transfer    Sit-Stand [place] (Transfers) contact guard  -     Assistive Device (Sit-Stand Transfers)  walker, front-wheeled  -HM     Comment, (Sit-Stand Transfer) cues for hand placement for safety  -     Row Name 12/22/22 1047          Gait/Stairs (Locomotion)    [place] Level (Gait) contact guard  -     Assistive Device (Gait) walker, front-wheeled  -HM     Distance in Feet (Gait) 100  -HM     Deviations/Abnormal Patterns (Gait) gait speed decreased;bilateral deviations  -     Bilateral Gait Deviations forward flexed posture;heel strike decreased  -     [place] Level (Stairs) not tested  -     Comment, (Gait/Stairs) pt ambulated with decreased gait speed with no LOB. Pt able to navigate sharp turns in hallway with no LOB.  -           User Key  (r) = Recorded By, (t) = Taken By, (c) = Cosigned By    Initials Name Provider Type     Mandy Soria, PT Physical Therapist               Obj/Interventions     Row Name 12/22/22 1049          Range of Motion Comprehensive    General Range of Motion bilateral lower extremity ROM WFL  -     Row Name 12/22/22 1049          Strength Comprehensive (MMT)    General Manual Muscle Testing (MMT) Assessment lower extremity strength deficits identified  -     Comment, General Manual Muscle Testing (MMT) Assessment BLE grossly 4/5  -     Row Name 12/22/22 1049          Motor Skills    Therapeutic Exercise other (see comments)  BLE LAQ, APs, hip ab/ad, heel slides, and seated marches x 10 reps each  -     Row Name 12/22/22 1049          Balance    Balance Assessment sitting static balance;sitting dynamic balance;sit to stand dynamic balance;standing static balance;standing dynamic balance  -     Static Sitting Balance independent  -     Dynamic Sitting Balance independent  -     Position, Sitting Balance sitting edge of bed  -     Sit to Stand Dynamic Balance contact guard  -HM     Static Standing Balance contact guard  -HM     Dynamic Standing Balance contact guard  -HM     Position/Device Used, Standing Balance supported  -     Row Name  12/22/22 1049          Sensory Assessment (Somatosensory)    Sensory Assessment (Somatosensory) LE sensation intact  -           User Key  (r) = Recorded By, (t) = Taken By, (c) = Cosigned By    Initials Name Provider Type     Mandy Soria PT Physical Therapist               Goals/Plan     Row Name 12/22/22 1054          Bed Mobility Goal 1 (PT)    Activity/Assistive Device (Bed Mobility Goal 1, PT) sit to supine/supine to sit  -HM     [place] Level/Cues Needed (Bed Mobility Goal 1, PT) standby assist  -HM     Time Frame (Bed Mobility Goal 1, PT) long term goal (LTG);10 days  -HM     Progress/Outcomes (Bed Mobility Goal 1, PT) new goal  -     Row Name 12/22/22 1054          Transfer Goal 1 (PT)    Activity/Assistive Device (Transfer Goal 1, PT) sit-to-stand/stand-to-sit  -HM     [place] Level/Cues Needed (Transfer Goal 1, PT) standby assist  -HM     Time Frame (Transfer Goal 1, PT) long term goal (LTG);10 days  -HM     Progress/Outcome (Transfer Goal 1, PT) new goal  -     Row Name 12/22/22 1054          Gait Training Goal 1 (PT)    Activity/Assistive Device (Gait Training Goal 1, PT) gait (walking locomotion);assistive device use  -HM     [place] Level (Gait Training Goal 1, PT) standby assist  -HM     Distance (Gait Training Goal 1, PT) 250  -HM     Time Frame (Gait Training Goal 1, PT) long term goal (LTG);10 days  -HM     Progress/Outcome (Gait Training Goal 1, PT) new goal  -     Row Name 12/22/22 1054          Therapy Assessment/Plan (PT)    Planned Therapy Interventions (PT) balance training;bed mobility training;gait training;home exercise program;postural re-education;patient/family education;neuromuscular re-education;strengthening;transfer training  -           User Key  (r) = Recorded By, (t) = Taken By, (c) = Cosigned By    Initials Name Provider Type     Mandy Soria PT Physical Therapist               Clinical Impression     Row Name 12/22/22 1051          Pain     Pretreatment Pain Rating 0/10 - no pain  -     Posttreatment Pain Rating 0/10 - no pain  -     Row Name 12/22/22 1051          Plan of Care Review    Plan of Care Reviewed With patient;daughter  -     Progress no change  -     Outcome Evaluation PT eval completed. Pt ambulated 100 feet with FWx with CGA with decreased gait speed and slight forward flexed posture. Pt demonstrated decreased functional endurance and strength deficits at this time warranting IPPT. d/c rec home with assist,  PT/OT, a BSC, and ambulating with walker at home for safety.  -     Row Name 12/22/22 1051          Therapy Assessment/Plan (PT)    Rehab Potential (PT) good, to achieve stated therapy goals  -     Criteria for Skilled Interventions Met (PT) yes;meets criteria;skilled treatment is necessary  -     Therapy Frequency (PT) daily  -     Row Name 12/22/22 1051          Vital Signs    Pre Systolic BP Rehab 115  -     Pre Treatment Diastolic BP 73  -     Pretreatment Heart Rate (beats/min) 109  -     O2 Delivery Pre Treatment nasal cannula  -     O2 Delivery Intra Treatment nasal cannula  -     O2 Delivery Post Treatment nasal cannula  -HM     Pre Patient Position Sitting  on edge of bed  -     Intra Patient Position Standing  -     Post Patient Position Sitting  -     Row Name 12/22/22 1051          Positioning and Restraints    Pre-Treatment Position in bed  -     Post Treatment Position chair  -     In Chair notified nsg;reclined;sitting;with family/caregiver;encouraged to call for assist;exit alarm on;call light within reach;waffle cushion  -           User Key  (r) = Recorded By, (t) = Taken By, (c) = Cosigned By    Initials Name Provider Type     Mandy Soria, PT Physical Therapist               Outcome Measures     Row Name 12/22/22 1055 12/22/22 0800       How much help from another person do you currently need...    Turning from your back to your side while in flat bed without using  bedrails? 3  - 3  -AR    Moving from lying on back to sitting on the side of a flat bed without bedrails? 3  - 3  -AR    Moving to and from a bed to a chair (including a wheelchair)? 3  - 3  -AR    Standing up from a chair using your arms (e.g., wheelchair, bedside chair)? 3  - 3  -AR    Climbing 3-5 steps with a railing? 3  - 3  -AR    To walk in hospital room? 3  - 3  -AR    AM-PAC 6 Clicks Score (PT) 18  - 18  -AR    Highest level of mobility 6 --> Walked 10 steps or more  - 6 --> Walked 10 steps or more  -AR    Row Name 12/22/22 1055          Functional Assessment    Outcome Measure Options AM-PAC 6 Clicks Basic Mobility (PT)  -           User Key  (r) = Recorded By, (t) = Taken By, (c) = Cosigned By    Initials Name Provider Type    AR Matthew Romo, RN Registered Nurse     Mandy Soria PT Physical Therapist                             Physical Therapy Education     Title: PT OT SLP Therapies (Done)     Topic: Physical Therapy (Done)     Point: Mobility training (Done)     Learning Progress Summary           Patient Acceptance, E,TB, VU,NR by  at 12/22/2022 1056    Comment: pt educated on energy conservation techniques with ambulation, given an HEP, and educated on utilizing walker at this time for safety                   Point: Home exercise program (Done)     Learning Progress Summary           Patient Acceptance, E,TB, VU,NR by  at 12/22/2022 1056    Comment: pt educated on energy conservation techniques with ambulation, given an HEP, and educated on utilizing walker at this time for safety                   Point: Body mechanics (Done)     Learning Progress Summary           Patient Acceptance, E,TB, VU,NR by  at 12/22/2022 1056    Comment: pt educated on energy conservation techniques with ambulation, given an HEP, and educated on utilizing walker at this time for safety                   Point: Precautions (Done)     Learning Progress Summary           Patient Acceptance,  E,TB, VU,NR by  at 12/22/2022 1056    Comment: pt educated on energy conservation techniques with ambulation, given an HEP, and educated on utilizing walker at this time for safety                               User Key     Initials Effective Dates Name Provider Type Discipline     09/22/22 -  Mandy Soria PT Physical Therapist PT              PT Recommendation and Plan  Planned Therapy Interventions (PT): balance training, bed mobility training, gait training, home exercise program, postural re-education, patient/family education, neuromuscular re-education, strengthening, transfer training  Plan of Care Reviewed With: patient, daughter  Progress: no change  Outcome Evaluation: PT eval completed. Pt ambulated 100 feet with FWx with CGA with decreased gait speed and slight forward flexed posture. Pt demonstrated decreased functional endurance and strength deficits at this time warranting IPPT. d/c rec home with assist, HH PT/OT, a BSC, and ambulating with walker at home for safety.     Time Calculation:    PT Charges     Row Name 12/22/22 1059             Time Calculation    Start Time 0900  -      PT Received On 12/22/22  -      PT Goal Re-Cert Due Date 01/01/23  -         Timed Charges    84197 - PT Therapeutic Exercise Minutes 10  -HM         Untimed Charges    PT Eval/Re-eval Minutes 31  -HM         Total Minutes    Timed Charges Total Minutes 10  -HM      Untimed Charges Total Minutes 31  -HM       Total Minutes 41  -HM            User Key  (r) = Recorded By, (t) = Taken By, (c) = Cosigned By    Initials Name Provider Type     Mandy Soria PT Physical Therapist              Therapy Charges for Today     Code Description Service Date Service Provider Modifiers Qty    51995000701 HC PT THER PROC EA 15 MIN 12/22/2022 Mandy Soria, PT GP 1    29084898468 HC PT EVAL LOW COMPLEXITY 3 12/22/2022 Mandy Soria, PT GP 1          PT G-Codes  Outcome Measure Options: AM-PAC 6 Clicks Basic  Mobility (PT)  AM-PAC 6 Clicks Score (PT): 18  PT Discharge Summary  Anticipated Discharge Disposition (PT): home with assist, home with home health    Mandy Soria, PT  12/22/2022

## 2022-12-22 NOTE — PROGRESS NOTES
[facility] Medicine Services  PROGRESS NOTE    Patient Name: Desirae Menchaca  : 1940  MRN: [id number]    Date of Admission: 2022  Primary Care Physician: Eileen Beckwith MD    Subjective   Subjective     CC:  F/U med mgmt     HPI:  Patient seen and examined. No new issues overnight. Discussed her BP meds.     ROS:  Gen- No fevers, chills  CV- No chest pain, palpitations  Resp- No cough, dyspnea  GI- No N/V/D, abd pain    Objective   Objective     Vital Signs:   Temp:  [97.2 °F (36.2 °C)-98.7 °F (37.1 °C)] 98.5 °F (36.9 °C)  Heart Rate:  [66-90] 81  Resp:  [14-20] 16  BP: (100-163)/(43-81) 115/73  Flow (L/min):  [2-6] 2     Physical Exam:  Constitutional: No acute distress, awake, alert  HENT: NCAT, mucous membranes moist  Respiratory: Clear to auscultation bilaterally, respiratory effort normal   Cardiovascular: RRR, no murmurs, rubs, or gallops  Gastrointestinal: Positive bowel sounds, soft, nondistended  Musculoskeletal: No bilateral ankle edema  Psychiatric: Appropriate affect, cooperative  Neurologic: Oriented x 3, speech clear  Skin: No rashes, abdominal wound covered, SPIKE drain in place    Results Reviewed:  LAB RESULTS:      Lab 22  1103   WBC 8.30   HEMOGLOBIN 13.5   HEMATOCRIT 41.7   PLATELETS 339   MCV 89.5         Lab 22  1103   POTASSIUM 4.6   HEMOGLOBIN A1C 5.60                         Brief Urine Lab Results     None          Microbiology Results Abnormal     None          Subcostal TAP    Result Date: 2022  Piyush Barger CRNA     2022  7:50 AM Subcostal TAP Patient reassessed immediately prior to procedure Patient location during procedure: OR Reason for block: at surgeon's request and post-op pain management Performed by CRNA/CAA: López Butcher CRNA Assisted by: Piyush Barger CRNA Preanesthetic Checklist Completed: patient identified, IV checked, site marked, risks and benefits discussed, surgical consent, monitors and  "equipment checked, pre-op evaluation and timeout performed Prep: Pt Position: supine Sterile barriers:cap, gloves, mask and washed/disinfected hands Prep: ChloraPrep Patient monitoring: blood pressure monitoring, continuous pulse oximetry and EKG Procedure Sedation: yes Performed under: general Guidance:ultrasound guided Images:still images obtained, printed/placed on chart Laterality:Bilateral Block Type:TAP Injection Technique:single-shot Needle Type:short-bevel and echogenic Needle Gauge:20 G Resistance on Injection: none Medications Used: dexamethasone sodium phosphate injection - Injection  4 mg - 12/21/2022 7:37:00 AM bupivacaine PF (MARCAINE) 0.25 % injection - Injection  60 mL - 12/21/2022 7:37:00 AM Medications Comment:Block Injection:  LA dose divided between Right and Left block Post Assessment Injection Assessment: negative aspiration for heme, incremental injection and no paresthesia on injection Patient Tolerance:comfortable throughout block Complications:no Additional Notes Subcostal TAPs A high-frequency linear transducer, with sterile cover, was placed sub-xiphoid to identify Linea Alba, right and left Rectus Abdominus Muscles (ANA). The transducer was moved either right or left subcostally to identify the ANA and the Transverse Abdominus Muscle (MEDINA). The insertion site was prepped in sterile fashion and then localized with 2-5 ml of 1% Lidocaine. Using ultrasound-guidance, a 20-gauge B-Barr 4\" Ultraplex 360 non-stimulating echogenic needle was advanced in plane, from medial to lateral, until the tip of the needle was in the fascial plane between the ANA and MEDINA. 1-3ml of preservative free normal saline was used to hydro-dissect the fascial planes. After the fascial plane was verified, the local anesthetic (LA) was injected. The procedure was repeated on the opposite side for bilateral coverage. Aspiration every 5 ml to prevent intravascular injection. Injection was completed with negative " aspiration of blood and negative intravascular injection. Injection pressures were normal with minimal resistance. The subcostal approach to the TAP nerve block ideally anesthetizes the intercostal nerves T6-T9.      XR Lost Needle / Instrument    Result Date: 12/21/2022  DATE OF EXAM: 12/21/2022 8:46 AM  PROCEDURE: XR LOST NEEDLE/INSTRUMENT-  INDICATIONS: GASTRIC BANDING REMOVAL LAPAROSCOPIC; K80.20-Calculus of gallbladder without cholecystitis without obstruction; K95.09-Other complications of gastric band procedure  COMPARISON: No comparisons available.  TECHNIQUE: 3 intraoperative radiographs of the supine abdomen status post removal of lap band device per protocol  FINDINGS:  Hemostat/clamps projects over the epigastrium. Laparoscopic ports project over the lower abdomen. Right upper quadrant cholecystectomy clips are noted. The previously seen lap band device and associated tubing is no longer visualized compatible with complete removal.      Impression: Hemostat/clamps projects over the epigastrium. Laparoscopic ports project over the lower abdomen. Right upper quadrant cholecystectomy clips are noted. The previously seen lap band device and associated tubing is no longer visualized compatible with complete removal.  Above findings were discussed with OR x-ray tech by Dr. Saleem Vidales in person at 9:15 AM 12/21/2022.  This report was finalized on 12/21/2022 9:35 AM by Saleem Vidales MD.            I have reviewed the medications:  Scheduled Meds:Albuterol Sulfate NEB Orderable, 2.5 mg, Nebulization, Q6H - RT  busPIRone, 10 mg, Oral, Q12H  DULoxetine, 120 mg, Oral, Daily  enoxaparin, 40 mg, Subcutaneous, Q12H  lisinopril, 40 mg, Oral, Q24H  pantoprazole, 40 mg, Oral, QAM  tiZANidine, 4 mg, Oral, BID      Continuous Infusions:sodium chloride 0.45 % with KCl 20 mEq, 75 mL/hr, Last Rate: 75 mL/hr (12/21/22 1233)      PRN Meds:.•  acetaminophen  •  ALPRAZolam  •  diphenhydrAMINE  •  ferric gluconate  •   furosemide  •  HYDROmorphone **AND** naloxone  •  HYDROmorphone  •  LORazepam  •  LORazepam  •  Morphine **AND** naloxone  •  ondansetron **FOLLOWED BY** [START ON 12/25/2022] ondansetron  •  oxyCODONE  •  phenol  •  prochlorperazine  •  promethazine  •  simethicone  •  traZODone  •  triamterene-hydrochlorothiazide  •  zolpidem    Assessment & Plan   Assessment & Plan     Active Hospital Problems    Diagnosis  POA   • Calculus of gallbladder without cholecystitis without obstruction [K80.20]  Yes   • Hypertension [I10]  Yes   • GERD (gastroesophageal reflux disease) [K21.9]  Yes   • Morbid obesity (HCC) [E66.01]  Yes      Resolved Hospital Problems    Diagnosis Date Resolved POA   • **Calculus of gallbladder without cholecystitis without obstruction [K80.20] 12/21/2022 Yes   • Complication of gastric banding [K95.09] 12/21/2022 Yes        Brief Hospital Course to date:  Desirae Menchaca is a 82 y.o. female with PMH significant for anxiety, arthritis, asthma, HLD, HTN, GERD, hx endometrial cancer who presented to [facility] as a direct admission to Dr. Carlson for bariatric and gallbladder surgery on 12/21/2022.      This patient's problems and plans were partially entered by my partner and updated as appropriate by me 12/22/22.  All problems are new to me today     S/p laparoscopic Lap-Band and cholecystectomy on 12/21/2022  --Pain controlled  --Ambulate, OOB  -- Diet per surgery      HTN  --Continue Lisinopril (substitite for her home Benazepril)  --States she only takes Maxide PRN      GERD  Morbid obesity  --Complicates all aspects of care    Expected Discharge Location and Transportation: Home   Expected Discharge 12/22  Expected Discharge Date and Time     Expected Discharge Date Expected Discharge Time    Dec 22, 2022            DVT prophylaxis:  Medical and mechanical DVT prophylaxis orders are present.     AM-PAC 6 Clicks Score (PT): 18 (12/21/22 1144)    CODE STATUS:   There are no questions and answers to  display.       Lilo Powers, DO  12/22/22

## 2023-01-04 ENCOUNTER — OFFICE VISIT (OUTPATIENT)
Dept: BARIATRICS/WEIGHT MGMT | Facility: CLINIC | Age: 83
End: 2023-01-04
Payer: MEDICARE

## 2023-01-04 VITALS
SYSTOLIC BLOOD PRESSURE: 142 MMHG | TEMPERATURE: 97.6 F | HEIGHT: 60 IN | DIASTOLIC BLOOD PRESSURE: 84 MMHG | HEART RATE: 98 BPM | RESPIRATION RATE: 18 BRPM | WEIGHT: 269.2 LBS | BODY MASS INDEX: 52.85 KG/M2 | OXYGEN SATURATION: 98 %

## 2023-01-04 DIAGNOSIS — Z48.89 POSTOPERATIVE VISIT: Primary | ICD-10-CM

## 2023-01-04 PROCEDURE — 1160F RVW MEDS BY RX/DR IN RCRD: CPT | Performed by: PHYSICIAN ASSISTANT

## 2023-01-04 PROCEDURE — 1159F MED LIST DOCD IN RCRD: CPT | Performed by: PHYSICIAN ASSISTANT

## 2023-01-04 PROCEDURE — 99024 POSTOP FOLLOW-UP VISIT: CPT | Performed by: PHYSICIAN ASSISTANT

## 2023-01-04 NOTE — PROGRESS NOTES
[facility] Bariatric Surgery  [address]  [address]  [facility] [id number] [phone]        Patient Name: Desirae Menchaca  YOB: 1940      Date of Visit: 01/04/2023        Reason for Visit:  2 weeks postop    HPI:  82 y.o. female s/p lap AGBR + chanda 12/21/22 GDW    Indications:  This is a  82-year-old super morbidly obese female known to me status post laparoscopic LapBand placement in 2006.  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  She has had progressive/worsening dysphagia for several months and vomiting and regurgitation on a daily basis and burning throat pain despite PPI therapy, no abdominal pain.    CT scan at  in July shows an ill-defined soft tissue density adjacent to the gastric band along the lesser curvature measuring 3.6 x 2.6 cm and band tubing free in the peritoneal cavity without subcutaneous access port.  It was felt this small collection may represent a complication of the band or less likely metastatic disease.  Also noted were gallstones.  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  EGD in October showed a chronic partial slip, no erosion appreciated.  Retained food material and liquid noted throughout the esophagus with mild esophageal dilatation and tortuosity.  Biopsies the antrum mid and distal esophagus were unremarkable.  Please see our office notes.  R/b/a rx were discussed and she wishes to proceed with laparoscopic LapBand removal and laparoscopic cholecystectomy.    Findings: No evidence of residual metastatic disease.  Enlarged fatty appearing finely nodular liver.  Extensive bruising without uncontrolled bleeding.  Seroma capsule around the LapBand buckle, no evidence of erosion.  Extensive omental  and small bowel adhesions to the anterior abdominal wall. There are numerous 2 mm cuboidal cholesterol type gallstones.    Final Diagnosis   1.  GALLBLADDER, CHOLECYSTECTOMY:  Chronic cholecystitis  Cholelithiasis     2.  GROSS ONLY DIAGNOSIS:  Consistent with a gastric band     Discharged on POD#1.  Doing \"great\".  Dysphagia resolved.  Tolerating PO.  Voiding well.  Postop abd.pain/shoulder pain resolved.  No dyspnea.  No fevers.  Mobility limited, but has started w/ home PT.   Taking Eliquis - was given 3 week RX to minimize VTE risk, but says lost some pills when the bottle spilled in her medicine bag - needs RX for another week.  No other issues/concerns.        Past Medical History:   Diagnosis Date   • Anxiety    • Arthritis    • Asthma    • Back pain    • Constipation    • Dermatitis     sees derm   • Endometrial cancer (HCC) 2016    s/p TAHBSO + chemo, follows @- was in port of lapband per pt   • Fibromyalgia    • GERD (gastroesophageal reflux disease)     on PPI   • Headache    • History of rheumatic fever    • Hyperlipidemia    • Hypertension    • Impaired mobility    • Morbid obesity (HCC)    • Murmur     just while pregnant   • Thyroid nodule     U/S, eval pending   • Vomiting     with lap band blockage   • Wears glasses      Past Surgical History:   Procedure Laterality Date   • ANKLE SURGERY Left 1999    plate and 5 screws   • BREAST BIOPSY Right    • CATARACT EXTRACTION, BILATERAL Bilateral    • CHOLECYSTECTOMY N/A 12/21/2022    Procedure: CHOLECYSTECTOMY LAPAROSCOPIC;  Surgeon: Sulaiman Carlson MD;  Location: [facility] OR;  Service: General;  Laterality: N/A;   • COLONOSCOPY     • DILATATION AND CURETTAGE      x2   • ENDOSCOPY N/A 10/26/2022    Procedure: ESOPHAGOGASTRODUODENOSCOPY WITH BIOPSY;  Surgeon: Sulaiman Carlson MD;  Location: [facility] ENDOSCOPY;  Service: General;  Laterality: N/A;   • GASTRIC BANDING REMOVAL N/A 12/21/2022    Procedure: GASTRIC BANDING REMOVAL LAPAROSCOPIC;  Surgeon:  Sulaiman Carlson MD;  Location: [facility];  Service: Bariatric;  Laterality: N/A;   • LAPAROSCOPIC GASTRIC BANDING  2006    w/ Dr. Carlson   • TONSILLECTOMY     • TOTAL ABDOMINAL HYSTERECTOMY  2016    w/ omentectomy + AGB port removal @ - probable endometrioid adenocarcinoma   • TOTAL SHOULDER ARTHROPLASTY Bilateral    • VENOUS ACCESS DEVICE (PORT) REMOVAL           Current Outpatient Medications:   •  Ammonium Lactate 5 % lotion, Every 12 (Twelve) Hours. Every 12 (Twelve) Hours., Disp: , Rfl:   •  benazepril (LOTENSIN) 40 MG tablet, benazepril 40 mg tablet  TAKE ONE TABLET BY MOUTH DAILY, Disp: , Rfl:   •  betamethasone, augmented, (DIPROLENE) 0.05 % cream, betamethasone, augmented 0.05 % topical cream  Apply to the palms and fingers twce a day for 2 weeks, then 2 times a week, if needed., Disp: , Rfl:   •  busPIRone (BUSPAR) 10 MG tablet, Take 10 mg by mouth Daily As Needed., Disp: , Rfl:   •  clobetasol (TEMOVATE) 0.05 % external solution, clobetasol 0.05 % scalp solution  Apply a thin layer to the affected areas on the scalp, nightly 2- 3 times a week, if needed., Disp: , Rfl:   •  Diclofenac Sodium (VOLTAREN) 1 % gel gel, Voltaren 1 % topical gel  0.5 gm 2x/day, Disp: , Rfl:   •  DULoxetine (CYMBALTA) 60 MG capsule, TAKE TWO CAPSULES BY MOUTH DAILY, Disp: 180 capsule, Rfl: 3  •  furosemide (LASIX) 20 MG tablet, Take 20 mg by mouth Daily As Needed., Disp: , Rfl:   •  mupirocin (BACTROBAN) 2 % ointment, Apply  topically to the appropriate area as directed 2 (Two) Times a Day., Disp: 15 g, Rfl: 0  •  omeprazole (priLOSEC) 40 MG capsule, 1 capsule Daily., Disp: , Rfl:   •  tiZANidine (ZANAFLEX) 4 MG tablet, TAKE ONE TABLET BY MOUTH TWICE A DAY, Disp: 180 tablet, Rfl: 3  •  traZODone (DESYREL) 50 MG tablet, , Disp: , Rfl:   •  triamterene-hydrochlorothiazide (MAXZIDE-25) 37.5-25 MG per tablet, Daily., Disp: , Rfl:   •  zolpidem (AMBIEN) 10 MG tablet, Take 10 mg by mouth At Night As Needed for Sleep., Disp: ,  Rfl:   •  apixaban (ELIQUIS) 2.5 MG tablet tablet, Take 1 tablet by mouth 2 (Two) Times a Day for 7 days., Disp: 14 tablet, Rfl: 0    Allergies   Allergen Reactions   • Dexamethasone Swelling   • Ciprofloxacin Unknown - Low Severity   • Risperidone Unknown - Low Severity     Social History     Tobacco Use   • Smoking status: Never   • Smokeless tobacco: Never   Vaping Use   • Vaping Use: Never used   Substance Use Topics   • Alcohol use: Yes     Alcohol/week: 10.0 standard drinks     Types: 10 Glasses of wine per week   • Drug use: Never       Social History     Social History Narrative    .  Lives in [place].       /84 (BP Location: Left arm, Patient Position: Sitting)   Pulse 98   Temp 97.6 °F (36.4 °C)   Resp 18   Ht 152.4 cm (60\")   Wt 122 kg (269 lb 3.2 oz)   SpO2 98%   BMI 52.57 kg/m²     Physical Exam  Constitutional:       Appearance: She is well-developed.      Comments: wearing a mask   Eyes:      General: No scleral icterus.  Cardiovascular:      Rate and Rhythm: Normal rate.   Pulmonary:      Effort: Pulmonary effort is normal.   Abdominal:      Palpations: Abdomen is soft.      Tenderness: There is no abdominal tenderness.      Hernia: No hernia is present.      Comments: incisions healing well   Musculoskeletal:      Comments: in a wheelchair   Skin:     General: Skin is warm and dry.      Findings: No rash.   Neurological:      Mental Status: She is alert.   Psychiatric:         Behavior: Behavior is cooperative.         Judgment: Judgment normal.           Assessment:   2 wks s/p lap band removal +chanda 12/21/22 w/ Dr. Carlson.         Plan:  Doing well.  Continue w/ lifting restrictions x 3 wks postop.  Continue w/ Eliquis as prescribed - additional RX escribed.  Call w/ issues/concers.  Follow up prn.        FISH Bermudez

## 2023-03-10 RX ORDER — DULOXETIN HYDROCHLORIDE 60 MG/1
CAPSULE, DELAYED RELEASE ORAL
Qty: 180 CAPSULE | Refills: 3 | OUTPATIENT
Start: 2023-03-10

## 2024-02-26 NOTE — TELEPHONE ENCOUNTER
Faxed request to [phone] on 08/30/22   Report called to SHYLA Rose on 6W. VSS. Pt transferred via bed with this RN back to room 642.

## (undated) DEVICE — ANTIBACTERIAL VIOLET BRAIDED (POLYGLACTIN 910), SYNTHETIC ABSORBABLE SUTURE: Brand: COATED VICRYL

## (undated) DEVICE — GLV SURG SENSICARE PI MIC PF SZ8.5 LF STRL

## (undated) DEVICE — SUT ETHLN 2/0 PS 18IN 585H

## (undated) DEVICE — SYR LUERLOK 50ML

## (undated) DEVICE — LAPAROSCOPIC SMOKE FILTRATION SYSTEM: Brand: PALL LAPAROSHIELD® PLUS LAPAROSCOPIC SMOKE FILTRATION SYSTEM

## (undated) DEVICE — Device: Brand: DEFENDO AIR/WATER/SUCTION AND BIOPSY VALVE

## (undated) DEVICE — SEALANT WND FIBRIN TISSEEL PREFIL/SYR/PRIMAFZ 10ML

## (undated) DEVICE — ENDOGATOR HYBRID TUBING KIT FOR USE WITH ENDOGATOR IRRIGATION PUMP, OLYMPUS PUMP, GI4000 ESU, AND TORRENT IRRIGATION PUMP.: Brand: ENDOGATOR KIT

## (undated) DEVICE — APPL COTN TP PLSTC 6IN STRL LF PK/2

## (undated) DEVICE — ADHS SKIN PREMIERPRO EXOFIN TOPICAL HI/VISC .5ML

## (undated) DEVICE — CONTN GRAD MEAS TRIANG 32OZ BLK

## (undated) DEVICE — GLV SURG SENSICARE SLT PF LF 9 STRL

## (undated) DEVICE — TROCAR: Brand: KII FIOS FIRST ENTRY

## (undated) DEVICE — ENDOPATH XCEL UNIVERSAL TROCAR STABLILITY SLEEVES: Brand: ENDOPATH XCEL

## (undated) DEVICE — ENDOPATH XCEL BLADELESS TROCARS WITH STABILITY SLEEVES: Brand: ENDOPATH XCEL

## (undated) DEVICE — THE BITE BLOCK MAXI, LATEX FREE STRAP IS USED TO PROTECT THE ENDOSCOPE INSERTION TUBE FROM BEING BITTEN BY THE PATIENT.

## (undated) DEVICE — TUBING,OXYGEN,CRUSH RES,7',CLEAR,UC: Brand: MEDLINE INDUSTRIES, INC.

## (undated) DEVICE — GOWN,NON-REINFORCED,SIRUS,SET IN SLV,XXL: Brand: MEDLINE

## (undated) DEVICE — PK BARIATRIC 10

## (undated) DEVICE — JACKSON-PRATT 100CC BULB RESERVOIR: Brand: CARDINAL HEALTH

## (undated) DEVICE — APL DUPLOSPRAYER MIS 40CM

## (undated) DEVICE — JP PERF DRN SIL FLT 10MM FULL: Brand: CARDINAL HEALTH

## (undated) DEVICE — SUT MONOCRYL PLS ANTIB UND 3/0  PS1 27IN

## (undated) DEVICE — Device: Brand: AIR/WATER CHANNEL CLEANING ADAPTER

## (undated) DEVICE — ENSEAL LAPAROSCOPIC TISSUE SEALER G2 ARTICULATING CURVED JAW FOR USE WITH G2 GENERATOR 5MM DIAMETER 45CM SHAFT LENGTH: Brand: ENSEAL

## (undated) DEVICE — APPL HEMOS FOR DELIVERY FLOSEAL

## (undated) DEVICE — SYR CONTRL LUERLOK 10CC

## (undated) DEVICE — SINGLE-USE BIOPSY FORCEPS: Brand: RADIAL JAW 4

## (undated) DEVICE — NDL HYPO ECLPS SFTY 22G 1 1/2IN

## (undated) DEVICE — ENDOPOUCH RETRIEVER SPECIMEN RETRIEVAL BAGS: Brand: ENDOPOUCH RETRIEVER

## (undated) DEVICE — PATIENT RETURN ELECTRODE, SINGLE-USE, CONTACT QUALITY MONITORING, ADULT, WITH 9FT CORD, FOR PATIENTS WEIGING OVER 33LBS. (15KG): Brand: MEGADYNE

## (undated) DEVICE — BLANKT WARM UPPR/BDY ARM/OUT 57X196CM

## (undated) DEVICE — GLV SURG SENSICARE PI MIC PF SZ9 LF STRL

## (undated) DEVICE — TBG PENCL TELESCP MEGADYNE SMOKE EVAC 10FT